# Patient Record
Sex: MALE | Race: WHITE
[De-identification: names, ages, dates, MRNs, and addresses within clinical notes are randomized per-mention and may not be internally consistent; named-entity substitution may affect disease eponyms.]

---

## 2023-07-18 ENCOUNTER — HOSPITAL ENCOUNTER (INPATIENT)
Dept: HOSPITAL 97 - ER | Age: 68
LOS: 10 days | Discharge: HOME | DRG: 536 | End: 2023-07-28
Attending: HOSPITALIST | Admitting: INTERNAL MEDICINE
Payer: COMMERCIAL

## 2023-07-18 VITALS — BODY MASS INDEX: 33.5 KG/M2

## 2023-07-18 DIAGNOSIS — E03.9: ICD-10-CM

## 2023-07-18 DIAGNOSIS — E78.00: ICD-10-CM

## 2023-07-18 DIAGNOSIS — Z79.899: ICD-10-CM

## 2023-07-18 DIAGNOSIS — S72.142A: Primary | ICD-10-CM

## 2023-07-18 DIAGNOSIS — E66.9: ICD-10-CM
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
## 2023-07-18 DIAGNOSIS — Z90.81: ICD-10-CM

## 2023-07-18 DIAGNOSIS — Z98.52: ICD-10-CM
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
## 2023-07-18 DIAGNOSIS — I95.1: ICD-10-CM
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
## 2023-07-18 DIAGNOSIS — I10: ICD-10-CM

## 2023-07-18 DIAGNOSIS — Z79.84: ICD-10-CM

## 2023-07-18 DIAGNOSIS — Z79.02: ICD-10-CM

## 2023-07-18 DIAGNOSIS — G47.33: ICD-10-CM

## 2023-07-18 DIAGNOSIS — Z88.8: ICD-10-CM

## 2023-07-18 DIAGNOSIS — Z79.890: ICD-10-CM

## 2023-07-18 DIAGNOSIS — Z71.3: ICD-10-CM
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
## 2023-07-18 LAB
BUN BLD-MCNC: 15 MG/DL (ref 7–18)
GLUCOSE SERPLBLD-MCNC: 119 MG/DL (ref 74–106)
HCT VFR BLD CALC: 40 % (ref 39.6–49)
HDLC SERPL-MCNC: 31 MG/DL (ref 40–60)
LDLC SERPL CALC-MCNC: 48 MG/DL (ref ?–130)
LYMPHOCYTES # SPEC AUTO: 2.2 K/UL (ref 0.7–4.9)
MAGNESIUM SERPL-MCNC: 1.9 MG/DL (ref 1.6–2.4)
MCV RBC: 99.3 FL (ref 80–100)
PMV BLD: 9.5 FL (ref 7.6–11.3)
POTASSIUM SERPL-SCNC: 4.1 MEQ/L (ref 3.5–5.1)
RBC # BLD: 4.03 M/UL (ref 4.33–5.43)
TROPONIN I SERPL HS-MCNC: 3.5 PG/ML (ref ?–58.9)

## 2023-07-18 PROCEDURE — 96375 TX/PRO/DX INJ NEW DRUG ADDON: CPT

## 2023-07-18 PROCEDURE — 83036 HEMOGLOBIN GLYCOSYLATED A1C: CPT

## 2023-07-18 PROCEDURE — 97161 PT EVAL LOW COMPLEX 20 MIN: CPT

## 2023-07-18 PROCEDURE — 83735 ASSAY OF MAGNESIUM: CPT

## 2023-07-18 PROCEDURE — 84484 ASSAY OF TROPONIN QUANT: CPT

## 2023-07-18 PROCEDURE — 97530 THERAPEUTIC ACTIVITIES: CPT

## 2023-07-18 PROCEDURE — 85025 COMPLETE CBC W/AUTO DIFF WBC: CPT

## 2023-07-18 PROCEDURE — 97116 GAIT TRAINING THERAPY: CPT

## 2023-07-18 PROCEDURE — 82947 ASSAY GLUCOSE BLOOD QUANT: CPT

## 2023-07-18 PROCEDURE — 80061 LIPID PANEL: CPT

## 2023-07-18 PROCEDURE — 36415 COLL VENOUS BLD VENIPUNCTURE: CPT

## 2023-07-18 PROCEDURE — 99285 EMERGENCY DEPT VISIT HI MDM: CPT

## 2023-07-18 PROCEDURE — 96374 THER/PROPH/DIAG INJ IV PUSH: CPT

## 2023-07-18 PROCEDURE — 80048 BASIC METABOLIC PNL TOTAL CA: CPT

## 2023-07-18 PROCEDURE — 71045 X-RAY EXAM CHEST 1 VIEW: CPT

## 2023-07-18 PROCEDURE — 97110 THERAPEUTIC EXERCISES: CPT

## 2023-07-18 PROCEDURE — 5A09457 ASSISTANCE WITH RESPIRATORY VENTILATION, 24-96 CONSECUTIVE HOURS, CONTINUOUS POSITIVE AIRWAY PRESSURE: ICD-10-PCS

## 2023-07-18 PROCEDURE — 81001 URINALYSIS AUTO W/SCOPE: CPT

## 2023-07-18 PROCEDURE — 84100 ASSAY OF PHOSPHORUS: CPT

## 2023-07-18 PROCEDURE — 72170 X-RAY EXAM OF PELVIS: CPT

## 2023-07-18 PROCEDURE — 93005 ELECTROCARDIOGRAM TRACING: CPT

## 2023-07-18 PROCEDURE — 97165 OT EVAL LOW COMPLEX 30 MIN: CPT

## 2023-07-18 RX ADMIN — HYDROMORPHONE HYDROCHLORIDE PRN MG: 1 INJECTION, SOLUTION INTRAMUSCULAR; INTRAVENOUS; SUBCUTANEOUS at 21:42

## 2023-07-18 RX ADMIN — ACETAMINOPHEN PRN MG: 325 TABLET ORAL at 21:47

## 2023-07-18 RX ADMIN — ATORVASTATIN CALCIUM SCH MG: 20 TABLET, FILM COATED ORAL at 21:10

## 2023-07-18 RX ADMIN — HEPARIN SODIUM SCH UNIT: 5000 INJECTION, SOLUTION INTRAVENOUS; SUBCUTANEOUS at 21:18

## 2023-07-18 RX ADMIN — Medication SCH ML: at 21:12

## 2023-07-18 RX ADMIN — MORPHINE SULFATE PRN MG: 4 INJECTION, SOLUTION INTRAMUSCULAR; INTRAVENOUS at 13:29

## 2023-07-18 RX ADMIN — MORPHINE SULFATE PRN MG: 4 INJECTION, SOLUTION INTRAMUSCULAR; INTRAVENOUS at 17:38

## 2023-07-18 RX ADMIN — ACETAMINOPHEN PRN MG: 325 TABLET ORAL at 16:00

## 2023-07-18 RX ADMIN — PANTOPRAZOLE SODIUM SCH MG: 40 TABLET, DELAYED RELEASE ORAL at 21:11

## 2023-07-18 NOTE — RAD REPORT
EXAM DESCRIPTION:  RAD - Hip Left 2 View - 7/18/2023 9:24 am

 

CLINICAL HISTORY:   Left hip pain status post injury

 

FINDINGS:   Intertrochanteric/greater trochanteric fracture left femur. Fracture fragments  
by 1 centimeter.

 

No dislocation

## 2023-07-18 NOTE — ER
Nurse's Notes                                                                                     

 [facility]                                                                 

Name: Flavio Summers                                                                                

Age: 68 yrs                                                                                       

Sex: Male                                                                                         

: 1955                                                                                   

MRN: [id number]                                                                                   

Arrival Date: 2023                                                                          

Time: 08:41                                                                                       

Account#: [id number]                                                                            

Bed 7                                                                                             

Private MD:                                                                                       

Diagnosis: Intertrochanteric fracture of femur                                                    

                                                                                                  

Presentation:                                                                                     

                                                                                             

08:41 Chief complaint: EMS states: slipped and fell onto left hip taking garbage out today.   aa5 

      Denies head injury, denies LOC.                                                             

08:41 Coronavirus screen: At this time, the client does not indicate any symptoms associated  aa5 

      with coronavirus-19. Ebola Screen: Patient denies travel to an Ebola-affected area in       

      the 21 days before illness onset. Initial Sepsis Screen: Does the patient meet any 2        

      criteria? No. Patient's initial sepsis screen is negative. Does the patient have a          

      suspected source of infection? No. Patient's initial sepsis screen is negative. Risk        

      Assessment: Do you want to hurt yourself or someone else? Patient reports no desire to      

      harm self or others. Onset of symptoms was 2023.                                   

08:41 Acuity: REMA 3                                                                           aa5 

08:41 Method Of Arrival: EMS: [facility]                                                     aa5 

08:41 Care prior to arrival: IV initiated. 20 GA, in the right antecubital area.              aa5 

08:41 Mechanism of Injury: Fall from standing position. Trauma event details: Injury occurred aa5 

      in the [facility], Injury occurred: at home. Injury occurred: 2023.        

12:14 Care prior to arrival: None.                                                            3 

                                                                                                  

Trauma Activation: Alert                                                                          

 Physician: ED Physician; Name: ; Notified At: ; Arrived At:                                      

 Physician: General Surgeon; Name: ; Notified At: ; Arrived At:                                   

 Physician: Radiology; Name: ; Notified At: ; Arrived At:                                         

 Physician: Respiratory; Name: ; Notified At: ; Arrived At:                                       

 Physician: Lab; Name: ; Notified At: ; Arrived At:                                               

                                                                                                  

Historical:                                                                                       

- Allergies:                                                                                      

08:43 Ibuprofen (itching );                                                                   aa5 

- Home Meds:                                                                                      

12:10 esomeprazole magnesium 40 mg oral capsule,delayed release (e.c.) [Active];              3 

      levothyroxine 112 mcg tablet daily [Active]; atorvastatin 20 mg oral tablet every           

      evening [Active]; carvedilol 12.5 mg oral tablet 2 times per day [Active]; metformin        

      500 mg Oral Tablet, Extended Release 24 hr [Active]; tadalafil 5 mg oral tablet             

      [Active]; cephalexin 500 mg oral capsule 4 times per day [Active];                          

- PMHx:                                                                                           

08:43 Hypertensive disorder; borderline diabetes; Hypothyroidism; Hypercholesterolemia;       aa5 

- PSHx:                                                                                           

08:43 spleenectomy; niyah knee sx; rotator cuff; Vasectomy;                                     aa5 

                                                                                                  

- Immunization history:: Adult Immunizations unknown.                                             

- Social history:: Smoking status: Patient denies any tobacco usage or history of.                

- Immunization history: Last tetanus immunization: - up to date.                                  

                                                                                                  

                                                                                                  

Screenin:48 [facility] ED Fall Risk Assessment (Adult) History of falling in the last 3 months,       aa5 

      including since admission Yes- single mechanical fall (1 pt) Confusion or                   

      Disorientation No (0 pts) Intoxicated or Sedated No (0 pts) Impaired Gait No (0 pts)        

      Mobility Assist Device Used No (0 pt) Altered Elimination No (0 pt) Score/Fall Risk         

      Level 0 - 2 = Low Risk Oriented to surroundings, Maintained a safe environment,             

      Educated pt \T\ family on fall prevention, incl call for assistance when getting out of     

      bed. Abuse screen: Denies threats or abuse. Nutritional screening: No deficits noted.       

      Tuberculosis screening: No symptoms or risk factors identified.                             

                                                                                                  

Primary Survey:                                                                                   

08:42 NO uncontrolled hemorrhage observed. A: The client is awake and alert. The airway is    aa5 

      patent. Breathing/Chest: Spontaneous respiratory effort, equal unlabored respirations,      

      breath sounds clear bilaterally, regular pattern, symmetrical chest rise and fall.          

      Circulation: No external hemorrhage present. Regular and strong central pulse, skin         

      warm/dry/normal color. Disability Client is alert. Exposure/Environment: A warming          

      method has been applied: A warm blanket has been provided to the patient.                   

09:00 Reassessment Alertness and Airway: Awake and alert. The airway is patent. Breathing:    aa5 

      Spontaneous respiratory effort, equal unlabored respirations, breath sounds clear           

      bilaterally, regular pattern with symmetrical chest rise and fall. Circulation: No          

      external hemorrhage noted. Regular and strong central pulse, skin warm/dry/normal           

      color. Disability: Alert.                                                                   

                                                                                                  

Secondary Survey:                                                                                 

08:42 HEENT: No deficits noted. Gastrointestinal: No deficits noted. : No signs and/or      aa5 

      symptoms were reported regarding the genitourinary system. Musculoskeletal: Reports         

      pain in left hip.                                                                           

                                                                                                  

Assessment:                                                                                       

08:42 General: Appears comfortable, Behavior is calm, cooperative. Pain: Complains of pain in aa5 

      left hip Pain currently is 3 out of 10 on a pain scale. Quality of pain is described as     

      crampy, Is continuous, Noted to be resistant to movement. Neuro: Level of Consciousness     

      is awake, alert, obeys commands, Oriented to person, place, time, situation.                

      Cardiovascular: Heart tones S1 S2 present Rhythm is regular. Respiratory: Airway is         

      patent Respiratory effort is even, unlabored, Respiratory pattern is regular,               

      symmetrical. GI: No signs and/or symptoms were reported involving the gastrointestinal      

      system. : No signs and/or symptoms were reported regarding the genitourinary system.      

      EENT: No signs and/or symptoms were reported regarding the EENT system. Derm: Skin is       

      pink, warm \T\ dry. Musculoskeletal: Reports pain in left hip left leg is externally        

      rotated.                                                                                    

08:50 Reassessment: Patient is alert, oriented x 3, equal unlabored respirations, skin        aa5 

      warm/dry/pink. Pt states "I am okay just as long as I don't move". Pain medication          

      offered by provider, pt declined at this time. .                                            

09:07 Reassessment: Pt now requesting pain medication, provider notified. .                   aa5 

09:10 Reassessment: Patient is alert, oriented x 3, equal unlabored respirations, skin        aa5 

      warm/dry/pink.                                                                              

09:12 Reassessment: X-RAY AT BEDSIDE .                                                        aa5 

09:40 Reassessment: Patient appears in no apparent distress at this time. Patient and/or      eh3 

      family updated on plan of care and expected duration. Pain level reassessed. Patient is     

      alert, oriented x 3, equal unlabored respirations, skin warm/dry/pink.                      

10:30 Reassessment: Patient appears in no apparent distress at this time. Patient and/or      eh3 

      family updated on plan of care and expected duration. Pain level reassessed. Patient is     

      alert, oriented x 3, equal unlabored respirations, skin warm/dry/pink.                      

11:30 Reassessment: Patient appears in no apparent distress at this time. Patient and/or      eh3 

      family updated on plan of care and expected duration. Pain level reassessed. Patient is     

      alert, oriented x 3, equal unlabored respirations, skin warm/dry/pink.                      

12:30 Reassessment: Patient appears in no apparent distress at this time. Patient and/or      eh3 

      family updated on plan of care and expected duration. Pain level reassessed. Patient is     

      alert, oriented x 3, equal unlabored respirations, skin warm/dry/pink.                      

                                                                                                  

Vital Signs:                                                                                      

08:41  / 82; Pulse 70; Resp 18 S; Temp 98(TE); Pulse Ox 100% ; Weight 108.86 kg (R);    aa5 

      Height 5 ft. 11 in. (R);                                                                    

09:10  / 77; Pulse 75; Resp 18 S; Pulse Ox 100% on R/A; Pain 6/10;                      aa5 

09:40  / 71; Pulse 65; Resp 18; Pulse Ox 100% on R/A;                                   eh3 

10:30  / 79; Pulse 67; Resp 18; Pulse Ox 100% on R/A;                                   eh3 

11:30  / 69; Pulse 76; Resp 18; Pulse Ox 100% on R/A;                                   eh3 

12:30  / 70; Pulse 76; Resp 18; Pulse Ox 100% on R/A;                                   eh3 

08:41 Body Mass Index 33.47 (108.86 kg, 180.34 cm)                                            aa5 

09:10 Pain Scale: Adult                                                                       aa5 

                                                                                                  

Amanda Coma Score:                                                                               

08:42 Eye Response: spontaneous(4). Motor Response: obeys commands(6). Verbal Response:       aa5 

      oriented(5). Total: 15.                                                                     

                                                                                                  

Trauma Score (Adult):                                                                             

08:42 Eye Response: spontaneous(1); Verbal Response: oriented(1); Motor Response: obeys       aa5 

      commands(2); Systolic BP: > 89 mm Hg(4); Respiratory Rate: 10 to 29 per min(4); [place]     

      Score: 15; Trauma Score: 12                                                                 

                                                                                                  

ED Course:                                                                                        

08:41 Patient arrived in ED.                                                                  aa5 

08:42 Arm band placed on Patient placed in an exam room.                                      aa5 

08:42 Patient has correct armband on for positive identification. Bed in low position. Call   aa5 

      light in reach. Side rails up X2. Adult w/ patient.                                         

08:43 Triage completed.                                                                       aa5 

08:44 [facility]inder [facility]                                                            sb4 

08:44 Ori Meehan MD is Attending Physician.                                              sb4 

08:47 Patient maintains SpO2 saturation greater than 95% on room air. Thermoregulation: warm  aa5 

      blanket given to patient.                                                                   

08:48 Christine Gupta RN is Primary Nurse.                                                   aa5 

09:16 Report given to MAURICE Booth.                                                               aa5 

09:26 Hip Left 2 View XRAY In Process Unspecified.                                            EDMS

10:29 Giuseppe Gama MD is Hospitalizing Provider.                                            sb4 

10:42 XRAY Chest (1 view) In Process Unspecified.                                             EDMS

12:14 No provider procedures requiring assistance completed. Maintain EMS IV. Dressing        eh3 

      intact. Good blood return noted. Site clean \T\ dry. Gauge \T\ site: 20g RAC.               

12:56 Patient admitted, IV remains in place.                                                  eh3 

                                                                                                  

Administered Medications:                                                                         

09:10 Drug: Ondansetron IVP 4 mg Route: IVP; Site: right antecubital;                         aa5 

11:13 Follow up: Response: No adverse reaction                                                eh3 

09:12 Drug: fentaNYL (PF) IVP 50 mcg Route: IVP; Site: right antecubital;                     aa5 

11:13 Follow up: Response: No adverse reaction                                                eh3 

10:35 Drug: HYDROmorphone IVP 0.5 mg Route: IVP; Site: right antecubital;                     eh3 

11:13 Follow up: Response: No adverse reaction                                                eh3 

12:09 Drug: HYDROmorphone IVP 0.5 mg Route: IVP; Site: right antecubital;                     eh3 

12:58 Follow up: Response: No adverse reaction                                                eh3 

                                                                                                  

                                                                                                  

Medication:                                                                                       

12:15 VIS not applicable for this client.                                                     eh3 

                                                                                                  

Outcome:                                                                                          

10:30 Decision to Hospitalize by Provider.                                                    sb4 

12:57 Admitted to Med/surg accompanied by tech, family with patient, via stretcher, room 201, eh3 

      Report called to  [place]                                                                     

12:57 Condition: stable                                                                           

12:57 Instructed on the need for admit.                                                           

13:01 Patient left the ED.                                                                    eh3 

                                                                                                  

Signatures:                                                                                       

Dispatcher [employer]                           EDChristine Estevez, MAURICE                     RN   aa5                                                  

Emmy Nuñez RN                          RN   eh3                                                  

Darya Linder, PADwaineC                     PAALICIA sb4                                                  

                                                                                                  

**************************************************************************************************

## 2023-07-18 NOTE — EDPHYS
Physician Documentation                                                                           

 [facility]                                                                 

Name: Flavio Summers                                                                                

Age: 68 yrs                                                                                       

Sex: Male                                                                                         

: 1955                                                                                   

MRN: [id number]                                                                                   

Arrival Date: 2023                                                                          

Time: 08:41                                                                                       

Account#: [id number]                                                                            

Bed 7                                                                                             

Private MD:                                                                                       

ED Physician Ori Meehan                                                                       

HPI:                                                                                              

                                                                                             

08:53 This 68 yrs old Male presents to ER via EMS with complaints of Fall Injury.             sb4 

08:53 Details of fall: The patient fell from an upright position, while walking. Onset: The   sb4 

      symptoms/episode began/occurred just prior to arrival. Associated injuries: The patient     

      sustained left hip, painful injury. 68 year old male with PMH of hypertension,              

      hypothyroidism, hypercholesterolemia presents s/p fall. He states that he was taking        

      his trash out this morning, tripped on his crocs, and fell directly onto his left hip.      

      He was on the ground for about 15 minutes and could not get up. He reports pain to the      

      left hip, no obvious deformity. States the pain is minimal when he is not moving it.        

      Neurovascularly intact.                                                                     

                                                                                                  

Historical:                                                                                       

- Allergies:                                                                                      

08:43 Ibuprofen (itching );                                                                   aa5 

- Home Meds:                                                                                      

12:10 esomeprazole magnesium 40 mg oral capsule,delayed release (e.c.) [Active];              eh3 

      levothyroxine 112 mcg tablet daily [Active]; atorvastatin 20 mg oral tablet every           

      evening [Active]; carvedilol 12.5 mg oral tablet 2 times per day [Active]; metformin        

      500 mg Oral Tablet, Extended Release 24 hr [Active]; tadalafil 5 mg oral tablet             

      [Active]; cephalexin 500 mg oral capsule 4 times per day [Active];                          

- PMHx:                                                                                           

08:43 Hypertensive disorder; borderline diabetes; Hypothyroidism; Hypercholesterolemia;       aa5 

- PSHx:                                                                                           

08:43 spleenectomy; niyah knee sx; rotator cuff; Vasectomy;                                     aa5 

                                                                                                  

- Immunization history:: Adult Immunizations unknown.                                             

- Social history:: Smoking status: Patient denies any tobacco usage or history of.                

- Immunization history: Last tetanus immunization: - up to date.                                  

                                                                                                  

                                                                                                  

ROS:                                                                                              

08:53 Constitutional: Negative for fever, chills, and weight loss, Eyes: Negative for injury, sb4 

      pain, redness, and discharge, ENT: Negative for injury, pain, and discharge,                

      Cardiovascular: Negative for chest pain, palpitations, and edema, Respiratory: Negative     

      for shortness of breath, cough, wheezing, and pleuritic chest pain, Abdomen/GI:             

      Negative for abdominal pain, nausea, vomiting, diarrhea, and constipation, Skin:            

      Negative for injury, rash, and discoloration, Neuro: Negative for headache, weakness,       

      numbness, tingling, and seizure.                                                            

08:53 MS/extremity: Positive for injury or acute deformity, pain, of the left hip.                

08:53 All other systems are negative.                                                             

                                                                                                  

Exam:                                                                                             

08:53 Constitutional:  This is a well developed, well nourished patient who is awake, alert,  sb4 

      and in no acute distress. Head/Face:  Normocephalic, atraumatic. Eyes:  Extra-ocular        

      motions intact.  Periorbital areas with no swelling, redness, or edema. Cardiovascular:     

       Regular rate and rhythm with a normal S1 and S2. Respiratory:  Lungs have equal breath     

      sounds bilaterally, clear to auscultation and percussion.  No rales, rhonchi or wheezes     

      noted.  No increased work of breathing, no retractions or nasal flaring. Abdomen/GI:        

      Soft, non-tender, no distension. Skin:  Warm, dry with normal turgor.  Normal color         

      with no rashes, no lesions, and no evidence of cellulitis. Neuro:  Awake and alert, GCS     

      15, oriented to person, place, time, and situation.  Cranial nerves II-XII grossly          

      intact.  Motor strength 5/5 in all extremities.  Sensory grossly intact.  Cerebellar        

      exam normal.  Normal gait.                                                                  

08:53 Musculoskeletal/extremity: Extremities: ROM: limited active range of motion due to          

      pain, limited passive range of motion due to pain, Circulation is intact in all             

      extremities. Sensation intact.                                                              

                                                                                                  

Vital Signs:                                                                                      

08:41  / 82; Pulse 70; Resp 18 S; Temp 98(TE); Pulse Ox 100% ; Weight 108.86 kg (R);    aa5 

      Height 5 ft. 11 in. (R);                                                                    

09:10  / 77; Pulse 75; Resp 18 S; Pulse Ox 100% on R/A; Pain 6/10;                      aa5 

09:40  / 71; Pulse 65; Resp 18; Pulse Ox 100% on R/A;                                   eh3 

10:30  / 79; Pulse 67; Resp 18; Pulse Ox 100% on R/A;                                   eh3 

11:30  / 69; Pulse 76; Resp 18; Pulse Ox 100% on R/A;                                   eh3 

12:30  / 70; Pulse 76; Resp 18; Pulse Ox 100% on R/A;                                   eh3 

08:41 Body Mass Index 33.47 (108.86 kg, 180.34 cm)                                            aa5 

09:10 Pain Scale: Adult                                                                       aa5 

                                                                                                  

[place] Coma Score:                                                                               

08:42 Eye Response: spontaneous(4). Motor Response: obeys commands(6). Verbal Response:       aa5 

      oriented(5). Total: 15.                                                                     

                                                                                                  

Trauma Score (Adult):                                                                             

08:42 Eye Response: spontaneous(1); Verbal Response: oriented(1); Motor Response: obeys       aa5 

      commands(2); Systolic BP: > 89 mm Hg(4); Respiratory Rate: 10 to 29 per min(4); [place]     

      Score: 15; Trauma Score: 12                                                                 

                                                                                                  

MDM:                                                                                              

08:44 Patient medically screened.                                                             sb4 

08:53 Differential diagnosis: fracture, strain, dislocation.                                  sb4 

10:28 Data reviewed: vital signs, nurses notes, radiologic studies, plain films, I have       sb4 

      discussed the patient's presentation/case with the attending Emergency Department           

      Physician; and as a result, I will admit patient. Consideration of                          

      Admission/Observation Patient was admitted/placed on observation. Management of patient     

      was discussed with the following: Consultant: Nate Verdugo. Management of            

      patient was discussed with the following: Hospitalist: Alvaro. Historians other than the     

      Patient: Spouse/Significant Other: spouse. Counseling: I had a detailed discussion with     

      the patient and/or guardian regarding: the historical points, exam findings, and any        

      diagnostic results supporting the discharge/admit diagnosis, radiology results, the         

      need for further work-up and treatment in the hospital.                                     

                                                                                                  

                                                                                             

10:03 Order name: Basic Metabolic Panel; Complete Time: 10:59                                 4 

                                                                                             

10:03 Order name: CBC with Diff; Complete Time: 10:52                                         sb4 

                                                                                             

11:19 Order name: Hemoglobin A1c; Complete Time: 14:05                                        EDMS

                                                                                             

11:19 Order name: Lipid Profile; Complete Time: 14:05                                         EDMS

                                                                                             

11:28 Order name: Magnesium; Complete Time: 14:05                                             EDMS

                                                                                             

11:28 Order name: Phosphorus; Complete Time: 14:05                                            EDMS

                                                                                             

11:28 Order name: Troponin High Sensitivity; Complete Time: 14:05                             EDMS

                                                                                             

11:28 Order name: Urinalysis w/ reflexes                                                      EDMS

                                                                                             

11:28 Order name: Basic Metabolic Panel                                                       EDMS

                                                                                             

11:28 Order name: Basic Metabolic Panel                                                       EDMS

                                                                                             

11:28 Order name: CBC with Automated Diff                                                     EDMS

                                                                                             

11:28 Order name: CBC with Automated Diff                                                     EDMS

                                                                                             

08:45 Order name: Hip Left 2 View XRAY; Complete Time: 09:58                                  sb4 

                                                                                             

10:03 Order name: XRAY Chest (1 view); Complete Time: 10:59                                   sb4 

                                                                                             

10:03 Order name: EKG; Complete Time: 10:04                                                   sb4 

                                                                                             

11:24 Order name: Heart Healthy                                                               EDMS

                                                                                             

10:03 Order name: EKG - Nurse/Tech; Complete Time: 10:35                                      sb4 

                                                                                             

10:03 Order name: IV Saline Lock; Complete Time: 10:20                                        sb4 

                                                                                             

10:03 Order name: O2 Per Protocol; Complete Time: 10:20                                       sb4 

                                                                                             

10:03 Order name: O2 Sat Monitoring; Complete Time: 10:20                                     sb4 

                                                                                                  

ECG:                                                                                              

10:34 Rate is 44 beats/min. Rhythm is regular, Normal Sinus Rhythm. QRS Hitchcock is Normal. IA    sb4 

      interval is normal at 206 msec. QRS interval is normal at 90 msec. QT interval is           

      normal at 394 msec. No Q waves. T waves are Normal. Clinical impression: Normal ECG.        

      Interpreted by me. Reviewed by me.                                                          

                                                                                                  

Administered Medications:                                                                         

09:10 Drug: Ondansetron IVP 4 mg Route: IVP; Site: right antecubital;                         aa5 

11:13 Follow up: Response: No adverse reaction                                                eh3 

09:12 Drug: fentaNYL (PF) IVP 50 mcg Route: IVP; Site: right antecubital;                     aa5 

11:13 Follow up: Response: No adverse reaction                                                eh3 

10:35 Drug: HYDROmorphone IVP 0.5 mg Route: IVP; Site: right antecubital;                     eh3 

11:13 Follow up: Response: No adverse reaction                                                eh3 

12:09 Drug: HYDROmorphone IVP 0.5 mg Route: IVP; Site: right antecubital;                     eh3 

12:58 Follow up: Response: No adverse reaction                                                eh3 

                                                                                                  

                                                                                                  

Disposition:                                                                                      

13:22 Co-signature as Attending Physician, Ori Meehan MD I agree with the assessment and   kdr 

      plan of care.                                                                               

                                                                                                  

Disposition Summary:                                                                              

23 10:30                                                                                    

Hospitalization Ordered                                                                           

      Hospitalization Status: Inpatient Admission                                             sb4 

      Provider: Giuseppe Gama                                                                 sb4 

      Location: [facility] (Inpatient)                                                 sb4 

      Condition: Fair                                                                         sb4 

      Problem: new                                                                            sb4 

      Symptoms: are unchanged                                                                 sb4 

      Bed/Room Type: Standard                                                                 sb4 

      Room Assignment: 201(23 12:12)                                                    dw  

      Diagnosis                                                                                   

        - Intertrochanteric fracture of femur                                                 sb4 

      Forms:                                                                                      

        - Medication Reconciliation Form                                                      sb4 

        - SBAR form                                                                           sb4 

Signatures:                                                                                       

Dispatcher [employer]                           Dafne Claros RN                        RN   dw                                                   

Ori Meehan MD MD   [facility]                                                  

Christine Gupta RN                     RN   5                                                  

Emmy Nuñez RN                          RN   3                                                  

Darya Linder PA-C                     [facility] sb4                                                  

                                                                                                  

Corrections: (The following items were deleted from the chart)                                    

12:12 10:30 sb4                                                                               dw  

                                                                                                  

**************************************************************************************************

## 2023-07-18 NOTE — XMS REPORT
Continuity of Care Document

                            Created on:2023



Patient:JONO SUMMERS

Sex:Male

:1955

External Reference #:[id number]





Demographics







                          Address                   [address] [place] [address]

 

                          Home Phone                9

 

                          Work Phone                [phone]

 

                          Mobile Phone              [phone]

 

                          Email Address             [email or web address]

 

                          Preferred Language        English

 

                          Marital Status            Unknown

 

                          Restorationist Affiliation     Unknown

 

                          Race                      Unknown

 

                          Additional Race(s)        White

 

                          Ethnic Group              Unknown









Author







                          Organization              [facility]

t

 

                          Address                   [address] [place] [address]. [address]



                                                    [place] [address]

 

                          Phone                     [phone]









Support







                Name            Relationship    Address         Phone

 

                AR SUMMERS               Unavailable     [phone]

 

                Nelly Summers   Spouse          [address] [phone]



                                                [place] [address] 









Care Team Providers







                    Name                Role                Phone

 

                    PCP, PATIENT DOES NOT HAVE A Primary Care Physician UnavailDeepa Luna MD     Attending Clinician [phone]

 

                    Unknown, Attending  Attending Clinician Unavailable

 

                    DEEPA GRAHAM        Attending Clinician Unavailable

 

                    Doctor Unassigned, No Name Attending Clinician Unavailable

 

                    Derick Malcolm   Attending Clinician [phone]

 

                    Giuseppe Alvarado Attending Clinician [phone]

 

                    Nas Castro MD Attending Clinician [phone]

[phone]

 

                    VISIT, NURSE FLORI SLEEP Attending Clinician Unavailable

 

                    Leah Felipe MA     Attending Clinician Unavailable

 

                    Katina Resendez  Attending Clinician [phone]

 

                    Nas Castro Attending Clinician [phone]

 

                    VISIT, NURSE KADEN SLEEP Attending Clinician Unavailable

 

                    Alexis Xiao         Attending Clinician [phone]

 

                    VISIT, NURSE FLORI BONNER Attending Clinician Unavailable

 

                    Katya Wilson      Attending Clinician [phone]

 

                    Clyde Sheldon      Attending Clinician [phone]

 

                    Clyde Sheldon      Admitting Clinician [phone]









Payers







           Payer Name Policy Type Policy Number Effective Date Expiration Date S

ource







Problems







       Condition Condition Condition Status Onset  Resolution Last   Treating Co

mments 

Source



       Name   Details Category        Date   Date   Treatment Clinician        



                                                 Date                 

 

       M23 -  M23 - Diagnosis Active 2017-0        2017             

  [facility]



       UNSPECIFIE UNSPECIFIE                         12:58:00               

l



       D INTERNAL D INTERNAL               00:01:                             He

stew



       DERANGEME DERANGEME               00                                 



              Active                                                  



              2017                                                  



               DANITA Reis                                                  

 

       Diabetes  Diabetes Problem Active 2023               

[facility]



       mellitus mellitus               -          23:02:26               l



       type 2 type 2               00:00:                             Neo



       (disorder) (disorder)               00                                 



              Active                                                  



              2015                                                  



               Problem                                                  



              2023                                                  



              Data                                                    



              migrated                                                  



              from                                                   



              Recovers                                                  



              on 8/5/15.                                                  



               Medical                                                  



              Group,[facility]                                                  



              [place]                                                   



              [place],[facility]                                                  



              Internal                                                  



              Medicine                                                  



              [facility]                                                  

 

       Metabolic        Problem Active 2023               Me

moria



       syndrome X Metabolic               3-24          23:02:26               l



       (disorder) syndrome X               00:00:                             He

stew



              (disorder)               00                                 



              Active                                                  



              2015                                                  



              Problem                                                  



              2023                                                  



              Data                                                    



              migrated                                                  



              from                                                   



              Recovers                                                  



              on 7/8/15.                                                  



               Medical                                                  



              Group,[facility]                                                  



              [place]                                                   



              [place],[facility]                                                  



              Internal                                                  



              Medicine                                                  



              [facility]                                                  

 

       719.41 -  719.41 - Diagnosis Active 2013             

  [facility]



       JOINT  JOINT                1-30          12:25:00               l



       PAIN-SHLD PAIN-SHLD               00:01:                             Herm

malik



              Active               00                                 



              2013                                                  



               [facility] [place]                                                  

 

       Hypothyroi  Hypothyro Problem Active 2023            

   [facility]



       dism   idism                2-26          23:02:26               l



       (disorder) (disorder)               00:00:                             He

rmann



              Active               00                                 



              2012                                                  



              Problem                                                  



              2023                                                  



              Data                                                    



              migrated                                                  



              from                                                   



              Recovers                                                  



              on                                                      



              5/30/15.                                                  



               Medical                                                  



              Group,[facility]                                                  



              [place]                                                   



              [place],[facility]                                                  



              Internal                                                  



              Medicine                                                  



              [facility]                                                  

 

       No known No known Disease                                           Metho

di



       active active                                                  st



       problems problems                                                  Hospit

a



                                                                      l

 

       Hyperchole  Hyperchol Problem Resolve               2022           

    [facility]



       sterolemia esterolemi        d                    01:45:23               

l



       (disorder) a                                                       Pablo

n



              (disorder)                                                  



              Resolved                                                  



              Problem                                                  



              2022                                                  



               Medical                                                  



              Group,                                                  



              [facility] [place]                                                  



              [place],                                                  



              [place]                                                  

 

       Hypertensi  Hypertens Problem Resolve               2022           

    Memoria



       ve     ruth           d                    01:45:23               l



       disorder, disorder,                                                  Herm

malik



       systemic systemic                                                  



       arterial arterial                                                  



       (disorder) (disorder)                                                  



              Resolved                                                  



              Problem                                                  



              2022                                                  



               Medical                                                  



              Group,                                                  



              [place]                                                  

 

       Idiopathic  Idiopathi Problem Resolve               2022           

    [facility]



       thrombocyt c             d                    01:45:23               l



       openic thrombocyt                                                  Pablo

n



       purpura openic                                                  



       (disorder) purpura                                                  



              (disorder)                                                  



              Resolved                                                  



              Problem                                                  



              2022                                                  



              resolved                                                  



              1960"s                                                   



              Medical                                                  



              Group,[facility]                                                  

 

       Monoclonal  Monoclona Problem Resolve               2022           

    Memoria



       gammopathy l             d                    01:45:23               l



       (disorder) gammopathy                                                  He

rmann



              (disorder)                                                  



              Resolved                                                  



              Problem                                                  



              2022                                                  



               Medical                                                  



              Group,                                                  



              [facility] [place]                                                  



              [place],                                                  



              [place]                                                  

 

       Sleep   Sleep Problem Resolve               2022               Marcos

lori



       apnea  apnea         d                    01:45:23               l



       (finding) (finding)                                                  Herm

malik



              Resolved                                                  



              Problem                                                  



              2022                                                  



               uses cpap                                                  



              machine-                                                  



              will bring                                                  



              on dos                                                   



              Medical                                                  



              Group,[facility]                                                  

 

       Ankle pain  Ankle Problem Active               2023               M

emoria



       (finding) pain                               23:02:26               l



              (finding)                                                  Neo



              Active                                                  



              Problem                                                  



              2023                                                  



               [facility]                                                  

 

       At risk   At risk Problem Active               2023               M

emoria



       for    for                                23:02:26               l



       negative negative                                                  Pablo

n



       response response                                                  



       to     to                                                      



       medication medication                                                  



       (finding) (finding)                                                  



              Active                                                  



              Problem                                                  



              2023                                                  



                                                                    



              Medical                                                  



              Group,                                                  



              [facility]                                                  



              [place]                                                   



              [place],[facility]                                                  



              Internal                                                  



              Medicine                                                  



              [facility]                                                  

 

       Benign  Benign Problem Active               2023               Marcos

lori



       hypertensi hypertensi                             23:02:26               

l



       on     on                                                      Neo



       (disorder) (disorder)                                                  



              Active                                                  



              Problem                                                  



              2023                                                  



              Data                                                    



              migrated                                                  



              from [facility]                                                  



              on                                                      



              5/30/15.                                                  



               Medical                                                  



              Group,                                                  



              [facility]                                                  



              [place]                                                   



              [place],[facility]                                                  



              Internal                                                  



              Medicine                                                  



              [facility]                                                  

 

       Benign  Benign Problem Active               2023               Marcos

lori



       prostatic prostatic                             23:02:26               l



       hypertroph hypertroph                                                  He

rmann



       with   with                                                    



       outflow outflow                                                  



       obstructio obstructio                                                  



       n      n                                                       



       (disorder) (disorder)                                                  



              Active                                                  



              Problem                                                  



              2023                                                  



               [facility]                                                  

 

       Osteoarthr        Problem Active               2023               M

emoria



       itis of Osteoarthr                             23:02:26               l



       knee   itis of                                                  [place]



       (disorder) knee                                                    



              (disorder)                                                  



              Active                                                  



              Problem                                                  



              2023                                                  



                                                                    



              Medical                                                  



              Group,[facility]                                                  

 

       Derangemen        Problem Active               2023               M

emoria



       t of   Derangemen                             23:02:26               l



       medial t of                                                    [place]



       meniscus medial                                                  



       (disorder) meniscus                                                  



              (disorder)                                                  



              Active                                                  



              Problem                                                  



              2023                                                  



                                                                    



              Medical                                                  



              Group,[facility]                                                  

 

       Edema of   Edema of Problem Active               2023              

 Memoria



       lower  lower                              23:02:26               l



       extremity extremity                                                  Herm

malik



       (finding) (finding)                                                  



              Active                                                  



              Problem                                                  



              2023                                                  



                                                                    



              Medical                                                  



              Group,[facility]                                                  

 

       Elevated  Elevated Problem Active               2023               

Mem[facility]



       liver  liver                              23:02:26               l



       enzymes enzymes                                                  [place]



       level  level                                                   



       (finding) (finding)                                                  



              Active                                                  



              Problem                                                  



              2023                                                  



                                                                    



              Medical                                                  



              Group,[facility]                                                  



              Internal                                                  



              Medicine                                                  



              [place]                                                  

 

       Foot pain  Foot pain Problem Active               2023             

  [facility]



       (finding) (finding)                             23:02:26               l



              Active                                                  [place]



              Problem                                                  



              2023                                                  



               Medical                                                  



              Group,                                                  



              [facility] [place]                                                  



              [facility]                                                  



              [place]                                                   



              [place],[facility]                                                  

 

       Gastroesop  Gastroeso Problem Active               2023            

   Mem[facility]



       hageal phageal                             23:02:26               l



       reflux reflux                                                  [place]



       disease disease                                                  



       (disorder) (disorder)                                                  



              Active                                                  



              Problem                                                  



              2023                                                  



                                                                    



              Medical                                                  



              Group,                                                  



              [facility]                                                  



              [place]                                                   



              [place],[facility]                                                  

 

       Hyperlipid        Problem Active               2023               M

emoria



       emia   Hyperlipid                             23:02:26               l



       (disorder) emia                                                    [place]

n



              (disorder)                                                  



              Active                                                  



              Problem                                                  



              2023                                                  



                                                                    



              Medical                                                  



              Group,[facility]                                                  



              Internal                                                  



              Medicine                                                  



              [facility]                                                  

 

       Lesion of   Lesion Problem Active               2023               

[facility]



       skin of of skin of                             23:02:26               l



       face   face                                                    [place]



       (disorder) (disorder)                                                  



              Active                                                  



              Problem                                                  



              2023                                                  



                                                                    



              Medical                                                  



              Group,[facility]                                                  

 

       Microalbum  Microalbu Problem Active               2023            

   [facility]



       inuria minuria                             23:02:26               l



       (finding) (finding)                                                  [place]

malik



              Active                                                  



              Problem                                                  



              2023                                                  



               Medical                                                  



              Group,[facility]                                                  

 

       Nocturia  Nocturia Problem Active               2023               

[facility]



       (finding) (finding)                             23:02:26               l



              Active                                                  [place]



              Problem                                                  



              2023                                                  



               Medical                                                  



              Group,                                                  



              [facility]                                                  



              Land,                                                  



              [place]                                                   



              [place],[facility]                                                  

 

       Obesity  Obesity Problem Active               2023               Me

moria



       (disorder) (disorder)                             23:02:26               

l



              Active                                                  [place]



              Problem                                                  



              2023                                                  



               Medical                                                  



              Group,                                                  



              [facility] [place]                                                  



              Land,                                                  



              [place]                                                   



              [place],[facility]                                                  

 

       Obstructiv  Obstructi Problem Active               2023            

   [facility]



       e sleep ve sleep                             23:02:26               l



       apnea  apnea                                                   [place]



       syndrome syndrome                                                  



       (disorder) (disorder)                                                  



               Active                                                  



              Problem                                                  



              2023                                                  



              Data                                                    



              migrated                                                  



              from                                                   



              [employer]                                                  



              on                                                      



              5/30/15.                                                  



               Medical                                                  



              Group,                                                  



              [facility] [place]                                                  



              Land,                                                  



              [place]                                                   



              Land,[facility]                                                  



              Internal                                                  



              Medicine                                                  



              [facility]                                                  

 

       Pain    Pain  Problem Active               2023               Memor

ia



       (finding) (finding)                             23:02:26               l



              Active                                                  [place]



              Problem                                                  



              2023                                                  



              Right knee                                                  



               Medical                                                  



              Group,[facility]                                                  

 

       Patient   Patient Problem Active               2023               M

emoria



       encounter encounter                             23:02:26               l



       status status                                                  [place]



       (finding) (finding)                                                  



              Active                                                  



              Problem                                                  



              2023                                                  



               [facility]                                                   



              Internal                                                  



              Medicine                                                  



              [place]                                                  

 

       Plantar  Plantar Problem Active               2023               Me

moria



       fasciitis fasciitis                             23:02:26               l



       (disorder) (disorder)                                                  He

rmann



              Active                                                  



              Problem                                                  



              2023                                                  



               Medical                                                  



              Group,                                                  



              [facility]                                                  



              Land,                                                  



              [place]                                                   



              [place],[facility]                                                  

 

       Reducible        Problem Active               2023               Me

moria



       umbilical Reducible                             23:02:26               l



       hernia umbilical                                                  [place]



       (disorder) hernia                                                  



              (disorder)                                                  



              Active                                                  



              Problem                                                  



              2023                                                  



               [facility]                                                   



              Internal                                                  



              Medicine                                                  



              [place]                                                  

 

       Skin    Skin  Problem Active               2023               Memor

ia



       lesion lesion                             23:02:26               l



       (disorder) (disorder)                                                  He

rmann



               Active                                                  



              Problem                                                  



              2023                                                  



              [facility]                                                  

 

       Urinary  Urinary Problem Active               2023               Me

moria



       incontinen incontinen                             23:02:26               

l



       ce     ce                                                      [place]



       (finding) (finding)                                                  



              Active                                                  



              Problem                                                  



              2023                                                  



              [facility]                                                  

 

       ACUTE   ACUTE Diagnosis Active               2017               Mem

oria



       MEDIAL MEDIAL                             10:43:00               l



       MENISCAL MENISCAL                                                  [place]

n



       INJURY OF INJURY OF                                                  



       RIGHT  RIGHT                                                   



       KNEE-S89.8 KNEE-S89.8                                                  



       1XA    1XA                                                     



              Active                                                   



              [place]                                                  

 

       Essential  Essential Diagnosis        2023       

        [facility]



       hypertensi hypertensi               5-15   09:24:20 09:24:20             

  l



       on     on                   19:56:                             [place]



       (disorder) (disorder)               00                                 



              05/15/2023                                                  



               Diagnosis                                                  



              2023                                                  



               Medical                                                  



              [facility]                                                  



              Internal                                                  



              Medicine                                                  



              [place]                                                  

 

       Type II  Type II Diagnosis        2023           

    [facility]



       diabetes diabetes               5-15   09:24:20 09:24:20               l



       mellitus mellitus               19:56:                             [place]

n



       without without               00                                 



       complicati complicati                                                  



       on     on                                                      



       (disorder) (disorder)                                                  



              05/15/2023                                                  



              Diagnosis                                                  



              2023                                                  



               Medical                                                  



              Group,[facility]                                                  



              Internal                                                  



              Medicine                                                  



              [place]                                                  

 

       Finding of   Finding Diagnosis        2023       

        [facility]



       enzyme of enzyme               5-15   09:24:20 09:24:20               l



       level  level                19:56:                             [place]



       (finding) (finding)               00                                 



              05/15/2023                                                  



              Diagnosis                                                  



              2023                                                  



                                                                    



              Medical                                                  



              [facility]                                                  



              Internal                                                  



              Medicine                                                  



              [place]                                                  

 

       Screening  Screening Diagnosis        2023       

        [facility]



       for    for                  5-15   09:24:20 09:24:20               l



       malignant malignant               19:56:                             [place]

malik



       neoplasm neoplasm               00                                 



       of colon of colon                                                  



       done   done                                                    



              05/15/2023                                                  



              Diagnosis                                                  



              2023                                                  



              [facility]                                                    



              Internal                                                  



              Medicine                                                  



              [place]                                                  

 

       Long-term  Long-term Diagnosis        2023       

        [facility]



       current current               5-15   09:24:20 09:24:20               l



       use of use of               19:56:                             [place]



       drug   drug                 00                                 



       therapy therapy                                                  



       (situation (situation                                                  



       )      )                                                       



              05/15/2023                                                  



              Diagnosis                                                  



              2023                                                  



               Medical                                                  



              [facility]                                                  



              Internal                                                  



              Medicine                                                  



              [place]                                                  







History of Past Illness







       Condition Condition Condition Status Onset  Resolution Last   Treating Co

mments 

Source



       Name   Details Category        Date   Date   Treatment Clinician        



                                                 Date                 

 

       Umbilical        Diagnosis        2022           

    Mem[facility]



       hernia Umbilical                  09:44:28 09:44:28               l



       (disorder) hernia               20:53:                             [place]

n



              (disorder)               00                                 



              2022                                                  



              Diagnosis                                                  



              2022                                                  



               [facility]                                                   



              Internal                                                  



              Medicine                                                  



              [place]                                                  

 

       Type 2  Type 2 [facility]        2022               

[facility]



       diabetes diabetes               8-15   01:45:23 01:45:23               l



       mellitus mellitus               19:36:                             [place]

n



       without without               00                                 



       complicati complicati                                                  



       ons    ons                                                     



              08/15/2022                                                  



               08/18/202                                                  



              2                                                     



              Medical                                                  



              Group                                                   

 

       Hypothyroi  Hypothyro Problem        2022        

       [facility]



       dism,  idism,               8-15   01:45:23 01:45:23               l



       unspecifie unspecifie               19:36:                             He

rmann



       d      d                    00                                 



              08/15/2022                                                  



               08/18/202                                                  



              2                                                     



              Medical                                                  



              Group                                                   

 

       Metabolic  Metabolic Problem        2022         

      [facility]



       syndrome syndrome               8-15   01:45:23 01:45:23               l



              08/15/2022               19:36:                             [place] trinidad



              2022               00                                 



               Medical                                                  



              Group                                                   

 

       Hyperlipid  Hyperlipi Problem        2022        

       [facility]



       emia,  demia,               8-15   01:45:23 01:45:23               l



       unspecifie unspecifie               19:36:                             He

rmann



       d      d                    00                                 



              08/15/2022                                                  



              2022                                                  



               Medical                                                  



              Group                                                   

 

       Essential  Essential Problem        2022         

      [facility]



       (primary) (primary)               8-15   01:45:23 01:45:23               

l



       hypertensi hypertensi               19:36:                             He

rmann



       on     on                   00                                 



              08/15/2022                                                  



              2022                                                  



               Medical                                                  



              Group                                                   

 

       Proteinuri  Proteinur Problem        2022        

       son Hernandez,                  8-15   01:45:23 01:45:23               l



       unspecifie unspecifie               19:36:                             He

rmann



       d      d                    00                                 



              08/15/2022                                                  



              2022                                                  



               Medical                                                  



              Group                                                   

 

       Obstructiv  Obstructi Problem        2022        

       [place]



       e sleep ve sleep               8-15   01:45:23 01:45:23               l



       apnea  apnea                19:36:                             [place]



       (adult) (adult)               00                                 



       (pediatric (pediatric                                                  



       )      )                                                       



              08/15/2022                                                  



               08/18/202                                                  



              2                                                     



              Medical                                                  



              Group                                                   

 

       Other long  Other Problem        2022            

   [facility]



       term   long term               8-15   01:45:23 01:45:23               l



       (current) (current)               19:36:                             [place]

malik



       drug   drug                 00                                 



       therapy therapy                                                  



              08/15/2022                                                  



               08/18/202                                                  



              [facility]                                                  



              Group                                                   

 

       Abnormal  Abnormal Problem        2022           

    [facility]



       levels of levels of               8-15   01:45:23 01:45:23               

l



       other  other                19:36:                             [place]



       serum  serum                00                                 



       enzymes enzymes                                                  



              08/15/2022                                                  



              2022                                                  



               Medical                                                  



              Group                                                   

 

       Gastro-eso  Gastro-es Problem        2022        

       [facility]



       phageal ophageal                  01:48:25 01:48:25               l



       reflux reflux               21:41:                             [place]



       disease disease               00                                 



       without without                                                  



       esophagiti esophagiti                                                  



       s      s                                                       



              2022                                                  



              [facility]                                                  



              Group                                                   







Allergies, Adverse Reactions, Alerts







       Allergy Allergy Status Severity Reaction(s) Onset  Inactive Treating Comm

ents 

Source



       Name   Type                        Date   Date   Clinician        

 

       IBUPROFE DRUG   Active Low    Hives                        [facility]



       N      INGREDI                                              ity of



                                          00:00:                      [place]



                                          00                          Medical



                                                                      [facility]

 

       Ibuprofe Propensi Active        Itching                       Unive

rs



       n      ty to                                               ity of



              adverse                      00:00:                      [place]



              reaction                      00                          Medical



              s                                                       Branch

 

       Ibuprofe Propensi Active        Itching                       [facility]

di



       n      ty to                                               st



              adverse                      00:00:                      [facility]osp[facility]



              reaction                      00                          l



              s to                                                    



              drug                                                    

 

       ibuprofe ibuprofe Active                                     [facility]

a



       n<sup>1< n<sup>1<                      6-29                        l



       /sup>  /sup>                       05:00:                      [facility]



                                          00                          

 

       NO KNOWN Drug   Active                                           [facility]



       ALLERGIE Class                                                   ity of



       S                                                              [facility]







Social History







           Social Habit Start Date Stop Date  Quantity   Comments   Source

 

           Gender identity                                             [facility] [place]

y [facility]

 

           Sexual orientation                                             [facility]

ist



                                                                  Hospital

 

           History of Social 2022                       [facility]

st



           function   00:00:00   00:00:00                         Hospital

 

           Tobacco use and 2022 Smokeless             Mu-ism



           exposure   00:00:00   00:00:00   tobacco non-user            Hospital

 

           [facility] 2017                       [facility]



                      19:17:36   19:17:36                         

 

           Sex Assigned At 1955                       [facility]

y of



           Birth      00:00:00   00:00:00                         [facility]









                Smoking Status  Start Date      Stop Date       Source

 

                Tobacco smoking consumption                                 [facility]

[facility]

 

                Tobacco smoking status                                 [facility]







Medications







       Ordered Filled Start  Stop   Current Ordering Indication Dosage Frequency

 Signature

                    Comments            Components          Source



     Medication Medication Date Date Medication? Clinician                (SIG) 

          



     Name Name                                                   

 

     atorvastati            Yes            20mg      1 tablet.           U

nivers



     n 20 mg      7-17                                              ity of



     tablet      10:55:                                              [address]

 

     tadalafiL 5            Yes            5mg       Take 1           Univ

ers



     mg tablet      7-17                               tablet by           ity o

f



               10:55:                               mouth.           [address]

 

     levothyroxi            Yes            112ug      1 tablet.           

Univers



     ne 112 mcg      7-17                                              ity of



     tablet      10:55:                                              [address]

 

     cephALEXin       2023- Yes       [id number] 500mg      Take 1         

  [facility]



     (KEFLEX)      7-17 07-25                          capsule by           ity 

of



     500 mg      00:00: 04:59                          mouth 4           [place]



     capsule      00   :00                           (four)           Medical



                                                  times           [place]



                                                  daily for           



                                                  7 days.           

 

     esomeprazol            Yes            40mg      Take 1           Univ

ers



     e 40 mg      6-18                               capsule by           ity of



     capsule      00:00:                               mouth in           [place]



               00                                 the            Medical



                                                  morning.           Branch

 

     atorvastati            Yes                      = 1 tab,           Me

moria



     n 20 mg      5-15                               PO,            l



     oral tablet      19:55:                               Bedtime, #           

[place]



               00                                 90 tab, 3           



                                                  Refill(s),           



                                                  Pharmacy:           



                                                  [facility]           



                                                  [facility] #[id number],           



                                                  180.34,           



                                                  cm,            



                                                  05/15/23           



                                                  14:01:00           



                                                  CDT,           



                                                  Height,           



                                                  111.364,           



                                                  kg,            



                                                  05/15/23           



                                                  14:01:00           



                                                  CDT,           



                                                  Weight           

 

     carvedilol            Yes                      12.5 mg =           Me

moria



     12.5 mg      5-15                               1 tab, PO,           l



     oral tablet      19:55:                               BID, # 180           

[place]



               00                                 tab, 3           



                                                  Refill(s),           



                                                  Pharmacy:           



                                                  [facility]           



                                                  cy #[id number],           



                                                  180.34,           



                                                  cm,            



                                                  05/15/23           



                                                  14:01:00           



                                                  CDT,           



                                                  Height,           



                                                  111.364,           



                                                  kg,            



                                                  05/15/23           



                                                  14:01:00           



                                                  CDT,           



                                                  Weight           

 

     esomeprazol            Yes                      = 1 cap,           Me

moria



     e 40 mg      4-21                               PO, Daily,           l



     oral      18:22:                               # 90 cap,           [facility]



     delayed      00                                 0              



     release                                         Refill(s),           



     capsule                                         Pharmacy:           



                                                  [id number],           



                                                  180.34,           



                                                  cm,            



                                                  23           



                                                  14:21:00           



                                                  CST,           



                                                  Height,           



                                                  113.352,           



                                                  kg,            



                                                  23           



                                                  14:21:00           



                                                  CST,           



                                                  Weight           

 

     atorvastati            Yes                      = 1 tab,           Me

moria



     n 20 mg      4-21                               PO,            l



     oral tablet      18:22:                               Bedtime, #           

[facility]



               00                                 90 tab, 0           



                                                  Refill(s),           



                                                  Pharmacy:           



                                                  [facility] #[id number],           



                                                  180.34,           



                                                  cm,            



                                                  23           



                                                  14:21:00           



                                                  CST,           



                                                  Height,           



                                                  113.352,           



                                                  kg,            



                                                  23           



                                                  14:21:00           



                                                  CST,           



                                                  Weight           

 

     atorvastati            Yes                      = 1 tab,           Me

moria



     n 20 mg      1-26                               PO,            l



     oral tablet      21:51:                               Bedtime, #           

[facility]



               00                                 90 tab, 0           



                                                  Refill(s),           



                                                  Pharmacy:           



                                                  [facility]           



                                                  [id number],           



                                                  180.34,           



                                                  cm,            



                                                  22           



                                                  14:15:00           



                                                  CST,           



                                                  Height,           



                                                  114.091,           



                                                  kg,            



                                                  22           



                                                  14:15:00           



                                                  CST,           



                                                  Weight           

 

     tadalafiL            Yes            5mg  Q24H Take 1           Method

i



     (CIALIS) 5      1-10                               tablet (5           st



     MG tablet      09:52:                               mg total)           Hos

jhony



               19                                 by mouth           l



                                                  daily as           



                                                  needed.           

 

     metFORMIN      2022      Yes                      500 mg = 1           Me

moria



     500 mg oral      1-14                               tab, PO,           l



     tablet      20:53:                               BID-Meals,           Isi

nn



               00                                 # 180 tab,           



                                                  3              



                                                  Refill(s),           



                                                  Pharmacy:           



                                                  [facility] #[id number],           



                                                  180.34,           



                                                  cm,            



                                                  22           



                                                  14:15:00           



                                                  CST,           



                                                  Height,           



                                                  114.091,           



                                                  kg,            



                                                  22           



                                                  14:15:00           



                                                  CST,           



                                                  Weight           

 

     atorvastati      2022      Yes                      = 1 tab,           Me

moria



     n 20 mg      0-07                               PO,            l



     oral tablet      18:33:                               Bedtime, #           

[place]



               00                                 90 tab, 0           



                                                  Refill(s),           



                                                  Pharmacy:           



                                                  [facility]           



                                                  [id number],           



                                                  180.34,           



                                                  cm,            



                                                  08/15/22           



                                                  14:16:00           



                                                  CDT,           



                                                  Height,           



                                                  113.182,           



                                                  kg,            



                                                  08/15/22           



                                                  14:16:00           



                                                  CDT,           



                                                  Weight           

 

     carvedilol            Yes                      12.5 mg =           Me

moria



     12.5 mg      8-15                               1 tab, PO,           l



     oral tablet      19:35:                               BID, # 180           

[place]



               00                                 tab, 3           



                                                  Refill(s),           



                                                  Pharmacy:           



                                                  [facility] #[id number],           



                                                  180.34,           



                                                  cm,            



                                                  08/15/22           



                                                  14:16:00           



                                                  CDT,           



                                                  Height,           



                                                  113.182,           



                                                  kg,            



                                                  08/15/22           



                                                  14:16:00           



                                                  CDT,           



                                                  Weight           

 

     Synthroid            Yes                      = 1 tab,           [facility]

lori



     112 mcg      8-15                               PO, Daily,           l



     (0.112 mg)      19:35:                               # 90 tab,           He

rmann



     oral tablet      00                                 3              



                                                  Refill(s),           



                                                  Pharmacy:           



                                                  [facility] #[id number],           



                                                  180.34,           



                                                  cm,            



                                                  08/15/22           



                                                  14:16:00           



                                                  CDT,           



                                                  Height,           



                                                  113.182,           



                                                  kg,            



                                                  08/15/22           



                                                  14:16:00           



                                                  CDT,           



                                                  Weight           

 

     TADALAFIL      2022- No                                      [facility]



     ORAL      12                                         st



               10:04: 00:00                                         [facility]osp[facility]



               53   :00                                          l

 

     levothyroxi            Yes                                     [facility]

i



     ne        -                                              st



     (SYNTHROID)      09:50:                                              Hospit

a



     112 mcg      15                                                l



     tablet                                                        

 

     atorvastati            Yes                                     Method

i



     n calcium      7-                                              st



     (ATORVASTAT      09:50:                                              Hospit

a



     IN ORAL)      15                                                l

 

     levothyroxi            Yes                                     [facility]

i



     ne        7-12                                              st



     (SYNTHROID)      09:50:                                              Hospit

a



     112 mcg      15                                                l



     tablet                                                        

 

     atorvastati            Yes                                     Method

i



     n calcium      7-12                                              st



     (ATORVASTAT      09:50:                                              Hospit

a



     IN ORAL)      15                                                l

 

     tadalafiL      2022- No        [id number] 5mg  Q24H Take 1           

[facility]



     (CIALIS) 5      --                          tablet (5           st



     MG tablet      00:00: 04:59                          mg total)           Ho

spita



               00   :00                           by mouth           l



                                                  daily as           



                                                  needed for           



                                                  erectile           



                                                  dysfunctio           



                                                  n for up           



                                                  to 30           



                                                  days.           

 

     tadalafiL      2022- No        [id number] 5mg  Q24H Take 1           

[facility]



     (CIALIS) 5      7 08-12                          tablet (5           st



     MG tablet      00:00: 04:59                          mg total)           Ho

spita



               00   :00                           by mouth           l



                                                  daily as           



                                                  needed for           



                                                  erectile           



                                                  dysfunctio           



                                                  n for up           



                                                  to 30           



                                                  days.           

 

     carvediloL            Yes                                     [facility]



     12.5 mg      7-10                                              ity of



     tablet      00:00:                                              [address]

 

     metFORMIN            Yes                                     [facility]



     500 mg      7-10                                              ity of



     tablet      00:00:                                              [address]

 

     metFORMIN            Yes                                     [facility]



     (GLUCOPHAGE      7-10                                              st



     ) 500 mg      00:00:                                              [facility]ospita



     tablet      00                                                l

 

     carvediloL            Yes                                     [facility]



     (COREG)      7-10                                              st



     12.5 MG      00:00:                                              [facility]ospita



     tablet      00                                                l

 

     metFORMIN            Yes                                     Methodi



     (GLUCOPHAGE      7-10                                              st



     ) 500 mg      00:00:                                              [facility]



     tablet      00                                                l

 

     carvediloL            Yes                                     [facility]



     (COREG)      7-10                                              st



     12.5 MG      00:00:                                              [facility]



     tablet      00                                                l

 

     esomeprazol            Yes                      = 1 cap,           Me

moria



     e 40 mg      5-09                               PO, Daily,           l



     oral      21:45:                               # 90 cap           Neo



     delayed      00                                 3              



     release                                         Refill(s),           



     capsule                                         Pharmacy:           



                                                  [facility]           



                                                   #[id number],           



                                                  180.34,           



                                                  cm,            



                                                  22           



                                                  15:16:00           



                                                  CDT,           



                                                  Height,           



                                                  114.545,           



                                                  kg,            



                                                  22           



                                                  15:16:00           



                                                  CDT,           



                                                  Weight           

 

     Metformin      2021      Yes                      500 mg = 1           Me

moria



     hydrochlori      1-16                               tab, PO,           l



     de 500 MG      15:44:                               BID-Meals,           He

rmann



     Oral Tablet      00                                 # 180 tab,           



                                                  3              



                                                  Refill(s)           

 

     metFORMIN      2021      Yes                      500 mg = 1           Me

moria



     500 mg oral      1-16                               tab, PO,           l



     tablet      15:44:                               BID-Meals,           Isi

nn



               00                                 # 180 tab,           



                                                  3              



                                                  Refill(s)           

 

     atorvastati      2021      Yes                      = 1 tab,           Me

moria



     n 20 mg      1-01                               PO,            l



     oral tablet      21:13:                               Bedtime, #           

[place]



               00                                 90 tab, 3           



                                                  Refill(s),           



                                                  Pharmacy:           



                                                  [facility] #[id number],           



                                                  180.34,           



                                                  cm,            



                                                  21           



                                                  15:15:00           



                                                  CDT,           



                                                  Height,           



                                                  115, kg,           



                                                  21           



                                                  15:15:00           



                                                  CDT,           



                                                  Weight           

 

     empaglifloz      2021      Yes                      10 mg = 1           M

emoria



     in 10 MG      1-01                               tab, PO,           l



     Oral Tablet      21:13:                               QAM, # 30           H

ermann



     [Jardiance]      00                                 tab, 11           



                                                  Refill(s),           



                                                  Pharmacy:           



                                                  [facility] #[id number],           



                                                  180.34,           



                                                  cm,            



                                                  21           



                                                  15:15:00           



                                                  CDT,           



                                                  Height,           



                                                  115, kg,           



                                                  21           



                                                  15:15:00           



                                                  CDT,           



                                                  Weight           

 

     Levothyroxi            Yes                      = 1 tab,           Me

moria



     ne Sodium      9-01                               PO, Daily,           l



     0.112 MG      21:42:                               # 90 tab,           Herm

malik



     Oral Tablet      00                                 3              



     [Synthroid]                                         Refill(s),           



                                                  Pharmacy:           



                                                  [facility] #[id number],           



                                                  180.34,           



                                                  cm,            



                                                  21           



                                                  14:23:00           



                                                  CDT,           



                                                  Height,           



                                                  116.023,           



                                                  kg,            



                                                  21           



                                                  14:23:00           



                                                  CDT,           



                                                  Weight           

 

     tadalafil 5            Yes                      = 1 tab,           Me

moria



     mg oral      6-01                               PO, Daily,           l



     tablet      21:45:                               INSTR:FLORECITA           Isi

nn



               00                                 GN             



                                                  PROSTATIC           



                                                  HYPERPLASI           



                                                  A, # 90           



                                                  tab, 3           



                                                  Refill(s),           



                                                  Pharmacy:           



                                                  [facility]           



                                                  [id number],           



                                                  180.34,           



                                                  cm,            



                                                  21           



                                                  14:50:00           



                                                  CDT,           



                                                  Height,           



                                                  113.636,           



                                                  kg,            



                                                  21           



                                                  14:50:00           



                                                  CDT,           



                                                  Weight           

 

     Esomeprazol            Yes                      = 1 cap,           Me

moria



     e 40 MG      1-25                               PO, Daily,           l



     Enteric      14:24:                               # 90           [place]



     Coated      00                                 unknown           



     Capsule                                         unit, 3           



                                                  Refill(s),           



                                                  Pharmacy:           



                                                  [facility]           



                                                  [id number],           



                                                  180.34,           



                                                  cm,            



                                                  21           



                                                  14:40:00           



                                                  CST,           



                                                  Height,           



                                                  114.205,           



                                                  kg,            



                                                  21           



                                                  14:40:00           



                                                  CST,           



                                                  Weight           

 

     Levothyroxi            Yes                      = 1 tab,           Me

moria



     ne Sodium      9-09                               PO, Daily,           l



     0.112 MG      16:08:                               # 90 tab,           Herm

malik



     Oral Tablet      00                                 3              



     [Synthroid]                                         Refill(s),           



                                                  [facility],           



                                                  Pharmacy:           



                                                  [facility] STORE           



                                                  [id number],           



                                                  180.34,           



                                                  cm,            



                                                  20           



                                                  15:11:00           



                                                  CDT,           



                                                  Height,           



                                                  116.534,           



                                                  kg,            



                                                  20           



                                                  15:11:00           



                                                  CDT,           



                                                  Weight           

 

     tadalafil 5      -0      Yes                      = 1 tab,           Me

moria



     mg oral      6-09                               PO, Daily,           l



     tablet      16:26:                               # 90 tab,           [facility]

n



               13                                 Refill(s)           



                                                  3,             



                                                  INSTR:FLORECITA           



                                                  GN             



                                                  PROSTATIC           



                                                  HYPERPLASI           



                                                  A,             



                                                  [facility]arm[facility]           



                                                  [id number]           

 

     tadalafil 5      -      Yes                      5 mg = 1           Me

moria



     MG Oral      4-24                               tab, PO,           l



     Tablet      16:51:                               Daily,           Neo



     [Cialis]      00                                 Benign           



                                                  Prostatic           



                                                  Hyperplasi           



                                                  a, # 90           



                                                  tab, 3           



                                                  Refill(s),           



                                                  Pharmacy:           



                                                  [facility] #[id number]           

 

     atorvastati      2018      Yes                      20 mg = 1           M

emoria



     n 20 mg      2-18                               tab, PO,           l



     oral tablet      13:54:                               Bedtime, #           

[place]



               00                                 90 tab, 3           



                                                  Refill(s),           



                                                  Pharmacy:           



                                                  [facility] #[id number]           

 

     Levothyroxi            Yes                      112            [facility]

a



     ne Sodium      9-24                               microgram           l



     0.112 MG      20:58:                               = 1 tab,           Isi

nn



     Oral Tablet      00                                 PO, Daily,           



     [Synthroid]                                         Brand Name           



                                                  Medically           



                                                  Necessary,           



                                                  # 90 tab,           



                                                  3              



                                                  Refill(s),           



                                                  [facility],           



                                                  Pharmacy:           



                                                  [id number]           

 

     nebivolol 5      -0      Yes                      5 mg = 1           Me

moria



     MG Oral      6-15                               tab, PO,           l



     Tablet      15:23:                               Daily,           Neo



     [Bystolic]      54                                 **Please           



                                                  fax to [facility]           



                                                  [phone]**, #           



                                                  90 tab, 2           



                                                  Refill(s),           



                                                  Pharmacy[facility]           



                                                  [id number]           

 

     Promethazin            No                       Notes: Do           M

emoria



     e                                        not give           l



               20:08:                               IV push.           Neo



               00                                 (Same as:           



                                                  Phenergan)           

 

     Ondansetron            No                       Notes:           Marcos

lori



                                              (Same as:           l



               20:08:                               Zofran)           Neo



               00                                 ***            



                                                  MEDICATION           



                                                  WASTE ***           



                                                  Product           



                                                  Size: 4 mg           



                                                  Product           



                                                  Wasted:           



                                                  ___ mg           

 

     Dexamethaso            No                       Notes:           Marcos

lori



     ne                                       Concentrat           l



               20:08:                               ion:           Neo



                                                4mg/ml           

 

     Morphine            No                       Notes:           [facility]



                                              (Same           l



               20:08:                               as:MORPhin                                            e Sulfate)           

 

     Hydromorpho            No                       Notes:           Marcos

lori



     ne                                       Same as           l



               20:08:                               Dilaudid                                                           

 

     Acetaminoph            No                       Notes: Max           

[facility]



     en                                       acetaminop           l



               20:08:                               hen 4000           [place]



               00                                 mg/day (4           



                                                  gm/day).           



                                                  (Same as:           



                                                  Tylenol           



                                                  Extra           



                                                  Strength)           

 

     Naloxone            No                       Notes:           [facility]



                                              Same as           l



               20:08:                               Narcan                                                           

 

     Flumazenil            No                       Notes:           Memor

ia



                                              (Same as:           l



               20:08:                               Romazicon)                                                           

 

     Labetalol            No                       Notes:           Mem[facility]

a



                                              (Same as:           l



               20:08:                               Normodyne,           [place]                                 Trandate)           



                                                  Push over           



                                                  2 minutes           



                                                  Give bolus           



                                                  over 2-3           



                                                  minutes.           

 

     ANES            No                       Notes:           [facility]



     Enalaprilat                                     (Same as:           l



               20:08:                               Vasotec-IV                                            )              

 

     Hydromorpho            No                       Notes:           Marcos

lori



     ne                                       Same as           l



               20:01:                               Dilaudid                                                           

 

     Morphine            No                       Notes:           [facility]



                                              (Same           l



               20:01:                               as:MORPhin                                            e Sulfate)           

 

     acetaminoph            No                       Notes: Do           M

emoria



     en-codeine                                     not exceed           l



     #3        20:01:                               4gm/day of           Neo



                                                acetaminop           



                                                  hen. (Same           



                                                  as:            



                                                  Tylenol           



                                                  with           



                                                  Codeine #           



                                                  3)             

 

     Acetaminoph            No                       Notes: Do           M

emoria



     en        -                               not exceed           l



               20:01:                               4 gm/day.           [place]



                                                (Same as:           



                                                  Tylenol)           

 

     Acetaminoph            Yes                      1 - 2 tab,           

[facility]



     en 300 MG /                                     PO, Q4H,           l



     Codeine      19:59:                               PRN Pain,           Isi

nn



     Phosphate      00                                 X 14 day,           



     60 MG Oral                                         # 50 tab,           



     Tablet                                         0              



     [Tylenol                                         Refill(s)           



     with                                                        



     Codeine #4]                                                        

 

     Cephalexin            Yes                      500 mg = 1           M

emoria



     500 MG Oral                                     cap, PO,           l



     Capsule      19:59:                               QID, X 3           [facility]

n



     [Keflex]      00                                 day, # 12           



                                                  cap, 0           



                                                  Refill(s)           

 

     ondansetron            No                       Route: IV,           

[facility]



     (ANES)                                     Drug form:           l



               19:51:                               INJ, ONCE,                                            Stop date:           



                                                  17           



                                                  14:51:00           



                                                  CDT            

 

     dexamethaso            No                       Route: IV,           

[facility]



     ne (ANES)                                     Drug form:           l



               19:50:                               INJ, ONCE,                                            Stop date:           



                                                  17           



                                                  14:50:00           



                                                  CDT            

 

     ceFAZolin            No                       Route: IV,           Me

moria



     (ANES)                                     Drug form:           l



               19:36:                               INJ, ONCE,                                            Stop date:           



                                                  17           



                                                  14:36:00           



                                                  CDT            

 

     propofol            No                       Route: IV,           [facility]

[facility]



     (ANES)                                     Drug form:           l



               19:36:                               INJ, ONCE,                                            Stop date:           



                                                  17           



                                                  14:36:00           



                                                  CDT            

 

     fentaNYL            No                       Route: IV,           [facility]

[facility]



     (ANES)                                     Drug form:           l



               19:35:                               INJ, ONCE,                                            Stop date:           



                                                  17           



                                                  14:35:00           



                                                  CDT            

 

     lidocaine            No                       Route: IV,           Me

moria



     (ANES)                                     Drug form:           l



               19:35:                               INJ, ONCE,                                            Stop date:           



                                                  17           



                                                  14:35:00           



                                                  CDT            

 

     midazolam            No                       Route: IV,           Me

moria



     (ANES)                                     Drug form:           l



               19:30:                               SOLN,           



               00                                 ONCE, Stop           



                                                  date:           



                                                  17           



                                                  14:30:00           



                                                  CDT            

 

     LR 1000 mL            No                       Route: IV,           M

em[facility]



     INJ (ANES)                                     Total           l



               18:54:                               Volume:           [place]



               00                                 1,000,           



                                                  Start           



                                                  date:           



                                                  17           



                                                  13:54:00           



                                                  CDT, Stop           



                                                  date:           



                                                  17           



                                                  14:54:00           



                                                  CDT            

 

     Tylenol            No                       1,000 mg,           [facility]

ia



                                              Route: PO,           l



               18:00:                               ONCALL,                                            Dosing           



                                                  Weight           



                                                  115.455,           



                                                  kg, Start           



                                                  date:           



                                                  17           



                                                  13:00:00           



                                                  CDT            

 

     Lidocaine            No                       Notes:           [facility]

a



     Hydrochlori                                     Preservati           l



     de 10 MG/ML      16:00:                               ve free.           He

rmann



     Injectable                                       (Same as:           



     Solution                                         Xylocaine           



                                                  MPF)           

 

     Calcium            No                       1,000 mL,           [facility]

ia



     Chloride                                     Rate: 25           l



     0.0014      15:40:                               ml/hr,           [facility]



     MEQ/ML /      00                                 Infuse           



     Potassium                                         over: 40           



     Chloride                                         hr, Route:           



     0.004                                         IV, Dosing           



     MEQ/ML /                                         Weight           



     Sodium                                         113.636           



     Chloride                                         kg, Total           



     0.103                                         Volume:           



     MEQ/ML /                                         1,000,           



     Sodium                                         Start           



     Lactate                                         date:           



     0.028                                         17           



     MEQ/ML                                         10:40:00           



     Injectable                                         CDT,           



     Solution                                         Duration:           



                                                  30 day,           



                                                  Stop date:           



                                                  17           



                                                  10:39:00           



                                                  CDT            

 

     Neurontin            No                       Notes:           [facility]

a



                                              (Same as:           l



               11:00:                               Neurontin)                                                           

 

     vancomycin            No                        2001 mg:           Me

moria



     + sodium                                     infuse           l



     chloride      11:00:                               over 2.5           Isi

nn



     0.9% 500 mL      00                                 hours ***           



     INJ (for IV                                         MEDICATION           



     set) 500 mL                                         WASTE ***           



                                                  Product           



                                                  Size: 1000           



                                                  mg Product           



                                                  Wasted:           



                                                  ___ mg           

 

     ceFAZolin            No                       Notes:           [facility]

a



                                              Same as:           l



               11:00:                               Ancef                                                           

 

     BD Normal            No                       Notes:           [facility]

a



     Saline                                     (Same as:           l



     Flush      11:00:                               BD                                              Posiflush)           

 

     oxyCONTIN            No                       Notes: Do           Mem

oria



                                              not crush           l



               11:00:                               or chew.                                            (Same as:           



                                                  OxyContin)           

 

     Centrum            Yes                      1 tab, PO,           [facility]

lori



     Silver      -21                               Daily, 0           l



     Men's      19:10:                               Refill(s)                                                           

 

     acetaminoph            Yes                      1,000 mg,           M

emoria



     en        -21                               PO, Daily,           l



               19:10:                               as needed           [place]



               00                                 for pain,           



                                                  0              



                                                  Refill(s)           

 

     Acetaminoph      2017-0      Yes                      0              [facility]

a



     en        4-21                               Refill(s)           l



               19:10:                                              [facility]



               00                                                







Immunizations







           Ordered Immunization Filled Immunization Date       Status     Commen

ts   Source



           Name       Name                                        

 

           influenza virus            2022-10-03 Completed             [facility] 

[facility]



           vaccine, inactivated            00:00:00                         

 

           PFIZER COVID-19 MRNA            2022 Completed             [facility]

[facility]



           VACCINATION            00:00:00                         [facility]

 

           PFIZER COVID-19 MRNA            2022 Completed             [facility]

[facility]



           VACCINATION            00:00:00                         [facility]

 

           SARS-CoV-2COVID-19            2022 Completed             [facility]

rial [place]



           [id number]            00:00:00                         

 

           PFIZER COVID-19 MRNA            2021 Completed             [facility]

[facility]



           VACCINATION            00:00:00                         Hospital

 

           PFIZER COVID-19 MRNA            2021 Completed             [facility]

[facility]



           VACCINATION            00:00:00                         [facility]

 

           [facility] Completed             [facility]

riamena Larson



           [id number]            00:00:00                         

 

           influenza virus            2021-10-04 Completed             [facility] 

[facility]



           vaccine, inactivated            00:00:00                         

 

           MODERNA COVID-19            2021 Completed             [facility]

[facility]



           MRNA VACCINATION            00:00:00                         Hospital

 

           MODERNA COVID-19            2021 Completed             [facility]

t



           MRNA VACCINATION            00:00:00                         [facility]

 

           SARS-CoV-2COVID-[facility]            2021 Completed             [facility]

donnell Larson



           [id number]<westfall            00:00:00                         



           p>1</sup>                                              

 

           SARS-CoV-2COVID-19            2021 Completed             [facility]

donnell Larson



           [id number]<westfall            00:00:00                         



           p>3</sup>                                              

 

           MODERNA COVID-19            2021 Completed             [facility]

t



           MRNA VACCINATION            00:00:00                         Hospital

 

           MODERNA COVID-19            2021 Completed             [facility]

t



           MRNA VACCINATION            00:00:00                         [facility]

 

           SARS-CoV-2COVID-19            2021 Completed             [facility]

donnell Larson



           [id number]<westfall            00:00:00                         



           p>2</sup>                                              

 

           SARS-CoV-2COVID-19            2021 Completed             [facility]



           [id number]<westfall            00:00:00                         



           p>4</sup>                                              

 

           influenza virus            2020 Completed             [facility] 

[place]



           vaccine,              00:00:00                         



           inactivated<sup>3</s                                             



           up>                                                    

 

           influenza virus            2020 Completed             [facility] 

[place]



           vaccine,              00:00:00                         



           inactivated<sup>1</s                                             



           up>                                                    

 

           influenza virus            2019-10-30 Completed             [facility] 

[place]



           vaccine,              00:00:00                         



           inactivated<sup>1</s                                             



           up>                                                    

 

           influenza virus            2019-10-30 Completed             [facility] 

[place]



           vaccine,              00:00:00                         



           inactivated<sup>4</s                                             



           up>                                                    

 

           influenza virus            2019-10-30 Completed             [facility] 

[place]



           vaccine,              00:00:00                         



           inactivated<sup>2</s                                             



           up>                                                    







Vital Signs







             Vital Name   Observation Time Observation Value Comments     Source

 

             Systolic blood 2023 15:50:00 135 mm[Hg]                [facility]

sity of



             pressure                                            [facility]

 

             Diastolic blood 2023 15:50:00 83 mm[Hg]                 Unive

rsity of



             [facility]

 

             Heart rate   2023 15:50:00 78 /min                   [facility]

 

             Body temperature 2023 15:50:00 36.72 Kassidy                 [facility]

 

             Respiratory rate 2023 15:50:00 18 /min                   [facility]

 

             Body height  2023 15:50:00 180.3 cm                  [facility]

 

             Body weight  2023 15:50:00 111.63 kg                 [facility]

 

             BMI          2023 15:50:00 34.32 kg/m2               [facility]

 

             Oxygen saturation in 2023 15:50:00 98 /min                   

[facility]



             Arterial blood by                                        [facility]

[facility]



             Pulse oximetry                                        [facility]

 

             Temperature Oral (F) 2023-05-15 19:01:00 97.5 F                    

[facility]

 

             Heart Rate   2023-05-15 19:01:00                           [facility]

 [place]

 

             Systolic (mm Hg) 2023-05-15 19:01:00                           [facility]ost[facility] jacobo [place]

 

             Diastolic (mm Hg) 2023-05-15 19:01:00                           [facility]

[facility] [place]

 

             Height       2023-05-15 19:01:00 5 [ft_i]                  [facility]

 [facility]

 

             Weight       2023-05-15 19:01:00                           [facility]

 [facility]

 

             BMI Calculated 2023-05-15 19:01:00                           [facility]

al [place]

 

             Heart Rate   2023 20:21:00                           [facility]

 [facility]

 

             Systolic (mm Hg) 2023 20:21:00                           [facility]

rial [place]

 

             Diastolic (mm Hg) 2023 20:21:00                           [facility]

[facility] [place]

 

             Height       2023 20:21:00 5 [ft_i]                  [facility]

 [place]

 

             Weight       2023 20:21:00                           [facility]

 [facility]

 

             BMI Calculated 2023 20:21:00                           [facility]

al [place]

 

             Temperature Oral (F) 2022 20:15:00 97.8 F                    

[facility] [place]

 

             Heart Rate   2022 20:15:00                           [facility]

 [facility]

 

             Systolic (mm Hg) 2022 20:15:00                           [facility]

rial [place]

 

             Diastolic (mm Hg) 2022 20:15:00                           [facility]

[facility] [place]

 

             Height       2022 20:15:00 5 [ft_i]                  [facility]

 [facility]

 

             Weight       2022 20:15:00                           [facility]

 [facility]

 

             BMI Calculated 2022 20:15:00                           [facility]

al [facility]

 

             Temperature Oral (F) 2022 18:12:00 98.0 F                    

[facility]

 

             Heart Rate   2022 18:12:00                           [facility]

 [facility]

 

             Systolic (mm Hg) 2022 18:12:00                           [facility]

rial [place]

 

             Diastolic (mm Hg) 2022 18:12:00                           [facility]

[facility]

 

             Height       2022 18:12:00 5 [ft_i]                  [facility]

 [place]

 

             Weight       2022 18:12:00                           [facility]

 [facility]

 

             BMI Calculated 2022 18:12:00                           [facility]

al [facility]

 

             Temperature Oral (F) 2022-08-15 19:16:00 98.1 F                    

[facility]

 

             Heart Rate   2022-08-15 19:16:00                           [facility]

 [facility]

 

             Systolic (mm Hg) 2022-08-15 19:16:00                           [facility]

[facility]

 

             Diastolic (mm Hg) 2022-08-15 19:16:00                           [facility]

[facility] [place]

 

             Height       2022-08-15 19:16:00 180.34 cm                 [facility]

 [place]

 

             Weight       2022-08-15 19:16:00                           [facility]

 [place]

 

             BMI Calculated 2022-08-15 19:16:00                           [facility]

al [place]

 

             Temperature Oral (F) 2022 20:16:00 98.4 F                    

[facility] [place]

 

             Heart Rate   2022 20:16:00                           [facility]

 [place]

 

             Systolic (mm Hg) 2022 20:16:00                           [facility]

[facility]

 

             Diastolic (mm Hg) 2022 20:16:00                           [facility]

[facility] [place]

 

             Height       2022 20:16:00 180.34 cm                 [facility]

 [facility]

 

             Weight       2022 20:16:00                           [facility]

 [facility]

 

             BMI Calculated 2022 20:16:00                           [facility]

al [facility]

 

             Temperature Oral (F) 2022 20:41:00 97.9 F                    

[facility]

 

             Heart Rate   2022 20:41:00                           [facility]

 [place]

 

             Systolic (mm Hg) 2022 20:41:00                           [facility]

rial [place]

 

             Diastolic (mm Hg) 2022 20:41:00                           [facility]

[facility] [place]

 

             Height       2022 20:41:00 180.34 cm                 [facility]

 [facility]

 

             Weight       2022 20:41:00                           [facility]

 [facility]

 

             BMI Calculated 2022 20:41:00                           [facility]

al [place]

 

             Temperature Oral (F) 2021 18:23:00 98.0 F                    

[facility]

 

             Heart Rate   2021 18:23:00                           [facility]

 [facility]

 

             Systolic (mm Hg) 2021 18:23:00                           [facility]

[facility]

 

             Diastolic (mm Hg) 2021 18:23:00                           [facility]

[facility] [place]

 

             Height       2021 18:23:00 180.34 cm                 [facility]

 [facility]

 

             Weight       2021 18:23:00                           [facility]

 [place]

 

             BMI Calculated 2021 18:23:00                           [facility]

al [place]

 

             Temperature Oral (F) 2021 20:15:00 98.3 F                    

[facility]

 

             Heart Rate   2021 20:15:00                           [facility]

 [place]

 

             Systolic (mm Hg) 2021 20:15:00                           [facility]

rial [place]

 

             Diastolic (mm Hg) 2021 20:15:00                           [facility]

[facility] [place]

 

             Height       2021 20:15:00 180.34 cm                 [facility]

 [facility]

 

             Weight       2021 20:15:00                           [facility]

 [place]

 

             BMI Calculated 2021 20:15:00                           [facility]

al [facility]

 

             Systolic (mm Hg) 2021 19:23:00                           [facility]

rial [place]

 

             Diastolic (mm Hg) 2021 19:23:00                           [facility]

[facility]

 

             Heart Rate   2021 19:23:00                           [facility]

 [facility]

 

             Height       2021 19:23:00 180.34 cm                 [facility]

 [place]

 

             Weight       2021 19:23:00                           [facility]

 [facility]

 

             BMI Calculated 2021 19:23:00                           [facility]

al [place]

 

             Systolic (mm Hg) 2021-06-15 15:19:00                           [facility]

[facility] [place]

 

             Diastolic (mm Hg) 2021-06-15 15:19:00                           [facility]

[facility] [place]

 

             Heart Rate   2021-06-15 15:19:00                           [facility]

 [facility]

 

             Height       2021-06-15 15:19:00 180.34 cm                 [facility]

 [facility]

 

             Weight       2021-06-15 15:19:00                           [facility]

 [facility]

 

             BMI Calculated 2021-06-15 15:19:00                           [facility]

al [place]

 

             Systolic (mm Hg) 2021 19:50:00                           [facility]

[facility] [place]

 

             Diastolic (mm Hg) 2021 19:50:00                           [facility]

[facility] [place]

 

             Heart Rate   2021 19:50:00                           [facility]

 [place]

 

             Temperature Oral (F) 2021 19:50:00 98.5 F                    

[facility]

 

             Height       2021 19:50:00 180.34 cm                 [facility]

 [place]

 

             Weight       2021 19:50:00                           [facility]

 [place]

 

             BMI Calculated 2021 19:50:00                           [facility]

al [place]

 

             Systolic (mm Hg) 2021 20:40:00                           [facility]

rial [place]

 

             Diastolic (mm Hg) 2021 20:40:00                           [facility]

[facility]

 

             Heart Rate   2021 20:40:00                           [facility]

 [facility]

 

             Height       2021 20:40:00 180.34 cm                 [facility]

 [place]

 

             Weight       2021 20:40:00                           [facility]

 [facility]

 

             BMI Calculated 2021 20:40:00                           [facility]

al [place]

 

             Systolic (mm Hg) 2020-10-05 19:45:00                           [facility]

[facility]

 

             Diastolic (mm Hg) 2020-10-05 19:45:00                           [facility]

[facility]

 

             Heart Rate   2020-10-05 19:45:00                           [facility]

 [place]

 

             Height       2020-10-05 19:45:00 180.34 cm                 [facility]

 [facility]

 

             Weight       2020-10-05 19:45:00                           [facility]

 [facility]

 

             BMI Calculated 2020-10-05 19:45:00                           [facility]

[facility]

 

             Systolic (mm Hg) 2020 20:11:00                           [facility]

[facility] [place]

 

             Diastolic (mm Hg) 2020 20:11:00                           [facility]

[facility] [place]

 

             Heart Rate   2020 20:11:00                           [facility]

 [place]

 

             Temperature Oral (F) 2020 20:11:00 98.2 F                    

[facility] [place]

 

             Height       2020 20:11:00 180.34 cm                 [facility]

 [place]

 

             Weight       2020 20:11:00                           [facility]

 [place]

 

             BMI Calculated 2020 20:11:00                           [facility]

[facility]

 

             Systolic (mm Hg) 2020 18:57:00                           [facility]

[facility]

 

             Diastolic (mm Hg) 2020 18:57:00                           [facility]

[facility] [place]

 

             Heart Rate   2020 18:57:00                           [facility]

 [place]

 

             Temperature Oral (F) 2020 18:57:00 98.0 F                    

[facility]

 

             Height       2020 18:57:00 180.34 cm                 [facility]

 [facility]

 

             Weight       2020 18:57:00                           [facility]

 [facility]

 

             BMI Calculated 2020 18:57:00                           [facility]

[facility] [place]

 

             Systolic (mm Hg) 2020-06-15 16:08:00                           [facility]

[facility] [place]

 

             Diastolic (mm Hg) 2020-06-15 16:08:00                           [facility]

[facility] [place]

 

             Heart Rate   2020-06-15 16:08:00                           [facility]

 [facility]

 

             Height       2020-06-15 16:08:00 180.34 cm                 [facility]

 [place]

 

             Weight       2020-06-15 16:08:00                           [facility]

 [place]

 

             BMI Calculated 2020-06-15 16:08:00                           [facility]

al [place]

 

             Systolic (mm Hg) 2020 20:00:00                           [facility]

[facility] [place]

 

             Diastolic (mm Hg) 2020 20:00:00                           [facility]

[facility] [place]

 

             Heart Rate   2020 20:00:00                           [facility]

 [place]

 

             Temperature Oral (F) 2020 20:00:00 98.9 F                    

[facility] [place]

 

             Height       2020 20:00:00 180.34 cm                 [facility]

 [facility]

 

             Weight       2020 20:00:00                           [facility]

 [place]

 

             BMI Calculated 2020 20:00:00                           [facility]

[facility]

 

             Systolic (mm Hg) 2019 19:58:00                           [facility]

[facility] [place]

 

             Diastolic (mm Hg) 2019 19:58:00                           [facility]

[facility]

 

             Heart Rate   2019 19:58:00                           [facility]

 [place]

 

             Temperature Oral (F) 2019 19:58:00 98.3 F                    

[facility]

 

             Height       2019 19:58:00 180.34 cm                 [facility]

 [place]

 

             BMI Calculated 2019 18:08:00                           [facility]

[facility]

 

             Weight       2019 18:08:00                           [facility]

 [place]

 

             Height       2019 18:08:00 180.34 cm                 [facility]

 [place]

 

             Heart Rate   2019 18:08:00                           [facility]

 [place]

 

             Temperature Oral (F) 2019 18:08:00 98.3 F                    

[facility] [place]

 

             Systolic (mm Hg) 2019 18:08:00                           [facility]

[facility] [place]

 

             Diastolic (mm Hg) 2019 18:08:00                           [facility]

[facility] [place]

 

             Weight       2019 18:08:00                           [facility]

 [facility]

 

             Height       2019 18:08:00 180.34 cm                 [facility]

 [facility]

 

             BMI Calculated 2019 18:08:00                           [facility]

[facility]

 

             Temperature Oral (F) 2019 18:08:00 97.8 F                    

[facility] [place]

 

             Heart Rate   2019 18:08:00                           [facility]

 [facility]

 

             Systolic (mm Hg) 2019 18:08:00                           [facility]

[facility]

 

             Diastolic (mm Hg) 2019 18:08:00                           [facility]

[facility] [place]

 

             Weight       2019 17:54:00                           [facility]

 [place]

 

             Heart Rate   2019 17:54:00                           [facility]

 [facility]

 

             Systolic (mm Hg) 2019 17:54:00                           [facility]

[facility]

 

             Diastolic (mm Hg) 2019 17:54:00                           [facility]

[facility] [place]

 

             Weight       2019 16:14:00                           [facility]

 [facility]

 

             BMI Calculated 2019 16:14:00                           [facility]

[facility]

 

             Height       2019 16:14:00 180.34 cm                 [facility]

 [facility]

 

             Temperature Oral (F) 2019 16:14:00 98.0 F                    

[facility] [place]

 

             Heart Rate   2019 16:14:00                           [facility]

 [facility]

 

             Systolic (mm Hg) 2019 16:14:00                           [facility]

[facility] [place]

 

             Diastolic (mm Hg) 2019 16:14:00                           [facility]

[facility]

 

             BMI Calculated 2019 19:12:00                           [facility]

[facility] [place]

 

             Weight       2019 19:12:00                           [facility]

 [facility]

 

             Height       2019 19:12:00 180.34 cm                 [facility]

 [place]

 

             Temperature Oral (F) 2019 19:12:00 98.1 F                    

[facility]

 

             Heart Rate   2019 19:12:00                           [facility]

 [facility]

 

             Systolic (mm Hg) 2019 19:12:00                           [facility]

[facility] [place]

 

             Diastolic (mm Hg) 2019 19:12:00                           [facility]

[facility]

 

             Height       2018-10-29 18:31:00 180.34 cm                 [facility]

 [place]

 

             BMI Calculated 2018-10-29 18:31:00                           [facility]

[facility]

 

             Weight       2018-10-29 18:31:00                           [facility]

 [place]

 

             Temperature Oral (F) 2018-10-29 18:31:00 98.5 F                    

[facility]

 

             Heart Rate   2018-10-29 18:31:00                           [facility]

 [place]

 

             Systolic (mm Hg) 2018-10-29 18:31:00                           [facility]

[facility]

 

             Diastolic (mm Hg) 2018-10-29 18:31:00                           [facility]

[facility]

 

             Height       2018 14:34:00 180.34 cm                 [facility]

 [place]

 

             Heart Rate   2018 14:34:00                           [facility]

 [place]

 

             Temperature Oral (F) 2018 14:34:00 98.0 F                    

[facility] [place]

 

             BMI Calculated 2018 14:34:00                           [facility]

[facility] [place]

 

             Weight       2018 14:34:00                           [facility]

 [place]

 

             Systolic (mm Hg) 2018 14:34:00                           [facility]

[facility] [place]

 

             Diastolic (mm Hg) 2018 14:34:00                           [facility]

[facility]

 

             Heart Rate   2018 19:01:00                           [facility]

 [facility]

 

             Systolic (mm Hg) 2018 19:01:00                           [facility]

[facility]

 

             Diastolic (mm Hg) 2018 19:01:00                           [facility]

[facility] [place]

 

             Weight       2018 19:01:00                           [facility]

 [facility]

 

             BMI Calculated 2018 21:43:00                           [facility]

[facility] [place]

 

             Weight       2018 21:43:00                           [facility]

 [facility]

 

             Height       2018 21:43:00 180.34 cm                 [facility]

 [facility]

 

             Temperature Oral (F) 2018 21:43:00 98.1 F                    

[facility]

 

             Respitory Rate 2018 21:43:00                           [facility]

[facility]

 

             Heart Rate   2018 21:43:00                           [facility]

 [place]

 

             Systolic (mm Hg) 2018 21:43:00                           [facility]

[facility]

 

             Diastolic (mm Hg) 2018 21:43:00                           [facility]

[facility]

 

             Height       2018 16:52:00 182.88 cm                 [facility]

 [place]

 

             Weight       2018 16:52:00                           [facility]

 [place]

 

             BMI Calculated 2018 16:52:00                           [facility]

[facility]

 

             Heart Rate   2018 16:52:00                           [facility]

 [facility]

 

             Temperature Oral (F) 2018 16:52:00 98.4 F                    

[facility] [place]

 

             Systolic (mm Hg) 2018 16:52:00                           [facility]

[facility]

 

             Diastolic (mm Hg) 2018 16:52:00                           [facility]

[facility]

 

             Height       2017 15:30:00 180.34 cm                 [facility]

 [facility]

 

             BMI Calculated 2017 15:30:00                           [facility]

[facility] [place]

 

             Weight       2017 15:30:00                           [facility]

 [place]

 

             Heart Rate   2017 15:30:00                           [facility]

 [place]

 

             Temperature Oral (F) 2017 15:30:00 98.2 F                    

[facility] [place]

 

             Systolic (mm Hg) 2017 15:30:00                           [facility]

[facility]

 

             Diastolic (mm Hg) 2017 15:30:00                           [facility]

[facility] [place]

 

             Systolic (mm Hg) 2017 21:15:00                           [facility]

[facility]

 

             Diastolic (mm Hg) 2017 21:15:00                           [facility]

[facility] [place]

 

             Respitory Rate 2017 20:45:00                           [facility]

[facility] [place]

 

             Systolic (mm Hg) 2017 20:45:00                           [facility]

[facility] [place]

 

             Diastolic (mm Hg) 2017 20:45:00                           [facility]

[facility] [place]

 

             Respitory Rate 2017 20:30:00                           [facility]

[facility] [place]

 

             Systolic (mm Hg) 2017 20:30:00                           [facility]

[facility] [place]

 

             Diastolic (mm Hg) 2017 20:30:00                           [facility]

[facility] [place]

 

             Respitory Rate 2017 20:15:00                           [facility]

[facility] [place]

 

             Heart Rate   2017 15:50:00                           [facility]

 [facility]

 

             Weight       2017 15:30:00                           [facility]

 [place]

 

             BMI Calculated 2017 15:30:00                           [facility]

[facility]

 

             Height       2017 18:38:00 180.34 cm                 [facility]

 [facility]







Procedures







                Procedure       Date / Time     Performing Clinician Source



                                Performed                       

 

                ASSIGNMENT OF BENEFITS 2023 15:39:16 Doctor Unassigned, No

 [facility]

 

                [id number]         2023-01-10 15:45:07 Nas Castro          

 

                POC URINALYSIS DIPSTICK 2023-01-10 15:39:01 Estefani [facility]



                                                [place]          

 

                [facility]:12[facility]



                ANTIGEN                         [place]          

 

                POC URINALYSIS DIPSTICK 2022 14:54:00 Estefani [facility]



                                                [place]          

 

                [id number]         2022 14:53:00 Nas Castro          

 

                Diabetic retinal eye 2021-11-15 06:00:00                 Leslieoria

mena Larson



                exam<sup>1</sup>                                 

 

                Measurement of  2021-06-15 15:26:00                 Sunny acosta



                post-voiding residual                                 



                urine and/or bladder                                 



                capacity by ultrasound,                                 



                non-imaging                                     

 

                Diabetic retinal eye 2020-10-27 05:00:00                 Noam [facility]



                exam<sup>2</sup>                                 

 

                Diabetic retinal eye 2018-10-30 05:00:00                 Noam Larson



                exam                                            

 

                Diabetic foot   2017 00:00:00                 [facility] 

acosta



                examination                                     

 

                Rotator cuff repair 2013 06:00:00                 [facility]

 [place]

 

                Colonoscopy     2012 05:00:00                 [facility] 

[place]

 

                Vasectomy                                       [facility]

 

                Splenectomy                                     [facility]

 

                Biopsy                                          [facility]

 

                Tooth<sup>4</sup>                                 [facility] Isi

nn

 

                Arthroscopy of knee                                 Sunny acosta







Plan of Care







             Planned Activity Planned Date Details      Comments     Source

 

             Future Scheduled 2023   Screening for              [facility]



             Test         12:23:13     malignant neoplasm of              



                                       colon (procedure)              



                                       [code = 304415924]              

 

             Future Scheduled 2023   Screening for              [facility]



             Test         12:23:13     malignant neoplasm of              



                                       colon (procedure)              



                                       [code = 257772913]              

 

             Future Scheduled 2023   Screening for              [facility]



             Test         12:23:13     malignant neoplasm of              



                                       colon (procedure)              



                                       [code = 699057558]              

 

             Future Scheduled 2023   Hepatitis C screening              [facility]



             Test         12:23:13     (procedure) [code =              



                                       761176681]                

 

             Future Scheduled 2023   Screening for              [facility]



             Test         12:23:13     malignant neoplasm of              



                                       colon (procedure)              



                                       [code = 774060245]              

 

             Future Scheduled 2023   Screening for              [facility]



             Test         12:23:13     malignant neoplasm of              



                                       colon (procedure)              



                                       [code = 925048261]              

 

             Future Scheduled 2023   SHINGLES VACCINES (1              [facility]



             Test         12:23:13     of 2) [code = SHINGLES              



                                       VACCINES (1 of 2)]              

 

             Future Scheduled 2023   65+ PNEUMOCOCCAL              [facility]



             Test         12:23:13     VACCINE (1 - PCV)              



                                       [code = 65+               



                                       PNEUMOCOCCAL VACCINE              



                                       (1 - PCV)]                

 

             Future Scheduled 2023   COVID-19 VACCINE (5 -              [facility]



             Test         12:23:13     Moderna series) [code              



                                       = COVID-19 VACCINE (5              



                                       - Moderna series)]              

 

             Future Scheduled 2023   INFLUENZA VACCINE              [facility]

[facility]



             Test         12:23:13     [code = INFLUENZA              



                                       VACCINE]                  

 

             Future Scheduled 2022   HEPATITIS B VACCINES              Met

[facility]



             Test         02:23:28     (1 of 3 - 3-dose              



                                       series) [code =              



                                       HEPATITIS B VACCINES              



                                       (1 of 3 - 3-dose              



                                       series)]                  

 

             Future Scheduled 2022   Hepatitis C screening              [facility]



             Test         02:23:28     (procedure) [code =              



                                       451456703]                

 

             Future Scheduled 2022   COLONOSCOPY SCREENING              [facility]



             Test         02:23:28     [code = COLONOSCOPY              



                                       SCREENING]                

 

             Future Scheduled 2022   SHINGLES VACCINES (1              Met

[facility]



             Test         02:23:28     of 2) [code = SHINGLES              



                                       VACCINES (1 of 2)]              

 

             Future Scheduled 2022   65+ [facility]OCOCCAL              [facility]



             Test         02:23:28     VACCINE (1 - PCV)              



                                       [code = 65+               



                                       PNEUMOCOCCAL VACCINE              



                                       (1 - PCV)]                

 

             Future Scheduled 2022   INFLUENZA VACCINE              Method

[facility]



             Test         02:23:28     [code = INFLUENZA              



                                       VACCINE]                  

 

             Future Scheduled 2022   COVID-19 VACCINE (5 -              [facility]



             Test         02:23:28     Booster for Moderna              



                                       series) [code =              



                                       COVID-19 VACCINE (5 -              



                                       Booster for Moderna              



                                       series)]                  







Encounters







        Start   End     Encounter Admission Attending Care    Care    Encounter 

Source



        Date/Time Date/Time Type    Type    Clinicians Facility Department ID   

   

 

        2023 Outpatient                 TERESSA GANNON    [id number]

[facility]



        13:15:00 13:15:00                                         77      mena Larson

 

        2023 Outpatient                 RASHID GANNON    [id number]

[facility]



        13:50:00 13:50:00                                         83      mena Larson

 

        2023 Outpatient                 RASHID GANNON    [id number]

[facility]



        12:00:00 12:00:00                                         84      mena Larson

 

        2023 Outpatient                 RASHID GANNON    [id number]

[facility]



        08:20:00 08:20:00                                         87      mena Larson

 

        2023 Outpatient                 RASHID GANNON    [id number]

[facility]



        13:50:00 13:50:00                                         85      mena Larson

 

        2023 Outpatient                 [facility]    [facility]    [id number]

[facility]



        08:00:00 08:00:00                                         86      l



                                                                        [place]

 

        2023 Urgent          Deepa Graham [facility]    [id number]

[id number] [facility]



        10:20:00 10:40:00 Care            Unknown, Attending HEALTH  [id number]

         ity of



                                                [place] [id number]         Rusty

as



                                                CORKY?BLEA [phone]         [address]



                                                MEDICAL                 



                                                OFFICE                  



                                                BUILDING                 

 

        2023 Outpatient R       RICHELLE   [facility]    [id number]

[id number] [facility]



        10:20:00 10:20:00                 DEEPA                          [facility]

 

        2023 Orders          Doctor  KENTRELL    [id number]

[id number] [facility]



        00:00:00 00:00:00 Only            Unassigned, SIRISHA   [id number]       

  ity of



                                        [facility] [id number]         Rusty

as



                                                        [phone]         [address]

 

        2023 Ambulatory                 [facility]    [facility]    [id number]

[facility]



        14:40:00 14:40:00 Pre-Reg                         Gastroenter 82      l



                                                        kayden Rojo         

 

        2023 Outpatient                 [facility]    [facility]    [id number]

[facility]



        09:40:00 09:40:00                                         82      l



                                                                        [place]

 

        2023 Out[facility]         [facility]    [id number]

365 



        09:40:00 09:40:00                 Nadim Musa                 82      

 

        2023-05-15 2023 Outpatient                 [facility]    [facility]    [id number]

[facility]



        19:10:00 04:59:59                                 Internal 80      l



                                                        Medicine         [facility] Rojo         

 

        2023-05-15 2023-05-15 [id number]

[id number] 



        14:10:00 23:59:59                 , Giuseppe Vidal                          

 

        2023-05-15 2023-05-15 Outpatient                 [facility]    [facility]    [id number]

[facility]



        14:10:00 14:10:00                                         80      mena Larson

 

        2023-05-10 2023 Between                 [facility]    [facility]    [id number]

 [facility]



        10:54:22 10:54:22 Visit                           Internal 56      l



                                                        Medicine         [place]



                                                        Rojo         

 

        2023-05-10 2023 Outpatient                 [facility]    [id number]

[id number] 



        05:54:22 05:54:22                                         56      

 

        2023 Outpatient                 [facility]    [facility]    [id number]

[facility]



        13:15:00 13:15:00                                         81      mena



                                                                        [place]

 

        2023 Outpatient                 [facility]    [facility]    [id number]

[facility]



        20:30:00 05:59:59                                 Internal 78      l



                                                        Medicine         [place]



                                                        Rojo         

 

        [facility]23 [id number]

[id number] 



        14:30:00 23:59:59                 , Giuseppe                 Alicia Vidal                          

 

        2023 Outpatient                 [facility]    [facility]    [id number]

365 [facility]



        14:30:00 14:30:00                                         78      l



                                                                        [place]

 

        2023 2023-02-10 Between                 IE    MG    [id number]

 Memoria



        13:33:53 13:33:53 Visit                           Internal 55      l



                                                        Medicine         [place]



                                                        [place]         

 

        2023 2023-02-10 Outpatient                 [facility]    [facility]    [id number]

375 



        07:33:53 07:33:53                                         55      

 

        2023 Outpatient                 [facility]    [facility]    [id number]

365 [facility]



        12:15:00 12:15:00                                         79      mena



                                                                        [place]

 

        2023 Phone                   [facility]    MG    [id number]

 Memoria



        20:06:52 05:59:59 Message                         Internal 17      l



                                                        Medicine         [place]         

 

        2023 Outpatient                 [facility]    [facility]    [id number]

355 



        14:06:52 23:59:59                                         17      

 

        2023-01-10 2023-01-10 Office          Malcolm Castro2.840.1 [id number]

9008 [facility]



        09:45:00 09:55:30 Visit           Nas 38728.1.1         [address]     [address] Chin  3.430.2.7                 Hospit

a



                                                .3.600101                 l



                                                .8                      

 

        2023-01-10 2023-01-10 Outpatient         ESTEFANI [facility]     [id number]

[address] [place]



        00:00:00 00:00:00                 NAS                 531     Method

i



                                                                        st

 

        2023-01-10 2023-01-10 Travel                  1.2.840.1 1.2.267.441 [id number]

[id number] [facility]



        00:00:00 00:00:00                         40832.1.1 350.1.13.43 684     

st



                                                3.430.2.7 0.2.7.3.698         Ho

spita



                                                .3.000370 084.8           l



                                                .8                      

 

        2022 2022-11-15 Outpatient                 IE    [facility]    [id number]

365 [facility]



        20:30:00 05:59:59                                 Internal 75      l



                                                        Medicine         [place]         

 

        2022 Outpatient         [facility]    [id number]

[id number] 



        14:30:00 23:59:59                 , Giuseppe                 75      



                                        Mar[place]                          

 

        2022 Outpatient                 IE    IE    [id number]

365 [facility]



        14:30:00 14:30:00                                         75      mena Larson

 

        2022 Between                 nullFlavo [facility]    [id number]

75 Memoria



        12:24:41 12:24:41 Visit                   r       Internal 53      l



                                                        Medicine         [place]         

 

        2022 Outpatient                 [facility]    [id number]

375 



        06:24:41 06:24:41                                         53      

 

        2022 Outpatient                 IE    IE    [id number]

365 [facility]



        12:15:00 12:15:00                                         76      l



                                                                        [place]

 

        2022 Outpatient                 nullFlavo [facility]    [id number]

[id number] Memoria



        18:15:00 04:59:59                         r       Internal 70      l



                                                        Medicine         [place]         

 

        2022 Outpatient         VISIT,  [facility]    [id number]

365 



        13:15:00 23:59:59                 NURSE STRB                 70      



                                        SLEEP                           

 

        2022 Outpatient                 IE    IE    [id number]

365 [facility]



        13:15:00 13:15:00                                         70      l



                                                                        Ne[place]

 

        2022-08-15 2022 Outpatient                 nullFlavo [facility]    [id number]

[id number] Memoria



        19:10:00 04:59:59                         r       Internal 73      l



                                                        Medicine         [place] Rojo         

 

        2022-08-15 2022-08-15 [facility]

[id number] 



        14:10:00 23:59:59                 , Giuseppe Vidal                          

 

        2022-08-15 2022-08-15 Outpatient                 [facility]    [facility]    [id number]

365 [facility]



        14:10:00 14:10:00                                         73      mena Larson

 

        2022 2022-08-10 Between                 nullFlavo [facility]    [id number]

75 Memoria



        22:05:40 22:05:40 Visit                   r       Internal 52      l



                                                        Medicine         [facility]         

 

        2022 2022-08-10 Outpatient                 [facility]    MG    [id number]

375 



        17:05:40 17:05:40                                         52      

 

        2022 2022-08-10 Between                 nullFlavo MG    [id number]

75 Memoria



        11:07:46 11:07:46 Visit                   r       Internal 51      l



                                                        Medicine         [place]



                                                        [place]         

 

        2022 2022-08-10 Outpatient                 MG    MG    [id number]

375 



        06:07:46 06:07:46                                         51      

 

        2022 Outpatient                 [facility]    [facility]    [id number]

365 [facility]



        14:00:00 14:00:00                                         74      mena



                                                                        [place]

 

        2022 Office          Estefani, 1.2.840.1 [id number]

6140 [facility]



        09:30:00 10:08:19 Visit           Nas 90494.1.1         160     st



                                        Giuseppe  3.430.2.7                 Hospit

a



                                                .3.503191                 l



                                                .8                      

 

        2022 Telephone         Destinee,   1.2.840.1 [id number]

[id number] [facility]



        00:00:00 00:00:00                 Leah  16368.1.1         103     st



                                                3.430.2.7                 Hospit

a



                                                .3.004047                 l



                                                .8                      

 

        2022 Travel                  1.2.840.1 1.2.447.422 3141

150423 [facility]



        00:00:00 00:00:00                         39740.1.1 350.1.13.43 437     

st



                                                3.430.2.7 0.2.7.3.698         Ho

spita



                                                .3.261896 084.8           l



                                                .8                      

 

        2022 Ambulatory                 nullFlavo [facility]    [id number]

[id number] Memoria



        15:00:00 15:00:00 Pre-Reg                 r       Urology 64      l



                                                        Shari Snowden

[facility]         

 

        2022 Outpatient                 IVANNAIE    IE    [id number]

365 [facility]



        10:00:00 10:00:00                                         64      mena



                                                                        [place]

 

        2022 Outpatient         Mal,  [facility]    [id number]

365 



        10:00:00 10:00:00                 Katina Stewart                 64      

 

        2022 2022-05-10 Outpatient                 nullFlavo [facility]    [id number]

[id number] Memoria



        19:50:00 04:59:59                         r       Internal 71      l



                                                        Medicine         [place]         

 

        2022 Outpatient         [facility]    851

[id number] 



        14:50:00 23:59:59                 , Giuseppe Vidal                          

 

        2022 Outpatient                 IE    IE    [id number]

365 [facility]



        14:50:00 14:50:00                                         71      mena Larson

 

        2022 Between                 nullFlavo [facility]    [id number]

75 Memoria



        13:02:49 13:02:49 Visit                   r       Internal 50      l



                                                        Medicine         [place]         

 

        2022 Outpatient                 [facility]    [id number]

375 



        08:02:49 08:02:49                                         50      

 

        2022 Outpatient                 IE    IE    [id number]

365 [facility]



        14:30:00 14:30:00                                         72      mena Larson

 

        2022 Outpatient                 IE    IE    [id number]

365 [facility]



        09:45:00 09:45:00                                         68      mena Larson

 

        2022 Outpatient                 nullFlavo [facility]    [id number]

[id number] Memoria



        20:30:00 05:59:59                         r       Internal 67      l



                                                        Medicine         [place]         

 

        2022 Outpatient         [facility]    851

[id number] 



        14:30:00 23:59:59                 , Giuseppe                 67      



                                        [place]                          

 

        [id number] Outpatient                 [facility]    [facility]    [id number]

365 [facility]



        14:30:00 14:30:00                                         67      mena Larson

 

        2022 Between                 nullFlavo [facility]    [id number]

75 [facility]oria



        13:38:24 13:38:24 Visit                   r       Internal 49      l



                                                        Medicine         [place]         

 

        2022 Outpatient                 [facility]    [facility]    [id number]

375 



        07:38:24 07:38:24                                         49      

 

        2022 Ambulatory                 nullFlavo [facility]    [id number]

12209 Memoria



        17:00:00 17:00:00 Pre-Reg                 r       Internal 68      l



                                                        Medicine         [place]         

 

        2022 Outpatient                 [facility]    [facility]    [id number]

365 



        11:00:00 11:00:00                                         68      

 

        2021-11-15 2021 Between                 nullFlavo [facility]    [id number]

75 [facility]



        23:26:00 23:26:00 Visit                   r       Internal 48      l



                                                        Medicine         [place]         

 

        2021-11-15 2021 Outpatient                 [facility]    [facility]    [id number]

375 



        17:26:00 17:26:00                                         48      

 

        2021 Phone                   nullFlavo [facility]    [id number]

55 Memoria



        23:31:40 04:59:59 Message                 r       Internal 16      l



                                                        Medicine         [place]         

 

        2021 Outpatient                 [facility]    [facility]    [id number]

355 



        18:31:40 23:59:59                                         16      

 

        2021 Outpatient                 nullFlavo [facility]    [id number]

[id number] Memoria



        18:45:00 04:59:59                         r       Internal 69      l



                                                        Medicine         [place]         

 

        2021 Outpatient         VISIT,  [facility]    [facility]    [id number]

365 



        13:45:00 23:59:59                 NURSE STRB                 69      



                                        SLEEP                           

 

        2021 Outpatient                 [facility]    [facility]    [id number]

365 [facility]



        13:45:00 13:45:00                                         69      l



                                                                        [place]

 

        2021 Outpatient                 nullFlavo [facility]    [id number]

[id number] Memoria



        20:10:00 04:59:59                         r       Internal 65      l



                                                        Medicine         [place]         

 

        [facility]21 [facility]         [facility]

[id number] 



        15:10:00 23:59:59                 , Giuseppe Vidal                          

 

        2021 Outpatient                 [facility]    [facility]    [id number]

365 [facility]



        15:10:00 15:10:00                                         65      mena Larson

 

        2021-10-27 2021-10-28 Between                 nullFlavo [facility]    [id number]

75 Memoria



        12:45:37 12:45:37 Visit                   r       Internal 46      l



                                                        Medicine         [place]



                                                        [place]         

 

        2021-10-27 2021-10-28 Outpatient                 [facility]    [facility]    [id number]

375 



        07:45:37 07:45:37                                         46      

 

        2021-10-25 2021-10-25 Ambulatory                 nullFlavo [facility]    [id number]

[id number] Memoria



        19:00:00 19:00:00 Pre-Reg                 r       Internal 66      l



                                                        Medicine         [place]         

 

        2021-10-25 2021-10-25 Outpatient                 MG    MG    [id number]

365 



        14:00:00 14:00:00                                         66      

 

        2021-10-11 2021-10-11 Outpatient                 [facility]    [facility]    [id number]

365 [facility]



        12:30:00 12:30:00                                         66      mena



                                                                        Neo

 

        2021 Phone                   nullFlavo [facility]    [id number]

55 Memoria



        19:56:06 04:59:59 Message                 r       Internal 15      l



                                                        Medicine         [place]         

 

        2021 Outpatient                 MG    [facility]    [id number]

355 



        14:56:06 23:59:59                                         15      

 

        2021 Outpatient                 nullFlavo [facility]    [id number]

[id number] Memoria



        19:30:00 04:59:59                         r       Internal 62      l



                                                        Medicine         [place]         

 

        [facility]21 [facility]         [facility]

[id number] 



        14:30:00 23:59:59                 , Giuseppe Vidal                          

 

        2021 Outpatient                 [facility]    [facility]    [id number]

365 [facility]



        14:30:00 14:30:00                                         62      mena Larson

 

        2021 2021-07-10 Between                 nullFlavo [facility]    [id number]

75 [facility]oria



        12:58:00 12:58:00 Visit                   r       Internal 44      l



                                                        Kadi Rojo         

 

        2021 2021-07-10 Outpatient                 MG    MG    [id number]

375 



        07:58:00 07:58:00                                         44      

 

        2021 Between                 nullFlavo MG    [id number]

75 Memoria



        13:05:59 13:05:59 Visit                   r       Internal 43      l



                                                        Kadi Rojo         

 

        [facility]21 [facility]                 [facility]    [id number]

375 



        08:05:59 08:05:59                                         43      

 

        2021 Outpatient                 MHIE    IE    [id number]

365 [facility]



        08:30:00 08:30:00                                         63      mena



                                                                        Neo

 

        2021-06-15 2021 Outpatient                 nullFlavo [facility]    [id number]

[id number] Memoria



        15:30:00 04:59:59                         r       Urology 54      l



                                                        Shari Snowden

[facility]         

 

        2021-06-15 2021-06-15 Outpatient         [facility]    [id number]

365 



        10:30:00 23:59:59                 Nas                 54      



                                        Giuseppe                          

 

        2021-06-15 2021-06-15 Outpatient                 IE    IE    [id number]

365 [facility]



        10:30:00 10:30:00                                         54      mena



                                                                        [place]

 

        2021 Phone                   nullFlavo MG    [id number]

55 [facility]oria



        13:56:17 04:59:59 Message                 r       Urology 14      l



                                                        Shari Snowden

[facility]         

 

        2021 [facility]                 [facility]    [id number]

355 



        08:56:17 23:59:59                                         14      

 

        2021 Phone                   nullFlavo MG    [id number]

55 Memoria



        19:23:50 04:59:59 Message                 r       Internal 13      l



                                                        Kadi Rojo         

 

        2021 [facility]                 [facility]    [id number]

355 



        14:23:50 23:59:59                                         13      

 

        2021 Outpatient                 nullFlavo MG    [id number]

[id number] Memoria



        20:00:00 04:59:59                         r       Internal 60      l



                                                        Kadi Rojo         

 

        [facility]21 [facility]         [facility]

[id number] 



        15:00:00 23:59:59                 , Giuseppe                 60      



                                        [place]                          

 

        2021 Outpatient                 [facility]    [facility]    [id number]

365 [facility]



        15:00:00 15:00:00                                         60      mena



                                                                        Ne[place]

 

        2021 Between                 nullFlavo [facility]    [id number]

75 [facility]oria



        11:26:44 11:26:44 Visit                   r       Internal 40      l



                                                        Medicine         [place] Rojo         

 

        2021 Outpatient                 [facility]    [facility]    [id number]

375 



        06:26:44 06:26:44                                         40      

 

        2021 Outpatient                 [facility]    [facility]    [id number]

365 [facility]



        08:00:00 08:00:00                                         61      mena Larson

 

        2021 Outpatient                 nullFlavo [facility]    [id number]

[id number] Memoria



        21:00:00 05:59:59                         r       Internal 58      l



                                                        Medicine         [place] Rojo         

 

        2021 [facility]         [facility]

[id number] 



        15:00:00 23:59:59                 , Giuseppe                 58      



                                        [place]                          

 

        2021 Outpatient                 [facility]    [facility]    [id number]

365 [facility]



        15:00:00 15:00:00                                         58      mena Larson

 

        2020 Between                 nullFlavo MG    [id number]

75 [facility]oria



        14:36:50 14:36:50 Visit                   r       Internal 39      l



                                                        Medicine         [place] Rojo         

 

        2020 Outpatient                 [facility]    MG    [id number]

375 



        08:36:50 08:36:50                                         39      

 

        2020 Outpatient                 [facility]    [facility]    [id number]

365 [facility]



        10:30:00 10:30:00                                         59      mena Larson

 

        2020 Phone                   nullFlavo [facility]    [id number]

55 Memoria



        15:13:37 05:59:59 Message                 r       Internal 12      l



                                                        Medicine         Neo Rojo         

 

        2020 Outpatient                 [facility]    [facility]    [id number]

355 



        09:13:37 23:59:59                                         12      

 

        2020-10-05 2020-10-06 Outpatient                 nullFlavo [facility]    [id number]

[id number] Memoria



        20:00:00 04:59:59                         r       Internal 55      l



                                                        Medicine         Neo



                                                        [place]         

 

        2020-10-05 2020-10-05 [facility]         [facility]

[id number] 



        15:00:00 23:59:59                 , Giuseppe                 55      



                                        [place]                          

 

        2020-10-05 2020-10-05 Outpatient                 [facility]    [facility]    [id number]

365 [facility]



        15:00:00 15:00:00                                         55      l



                                                                        [place]

 

        2020 Between                 nullFlavo MG    [id number]

75 Memoria



        13:31:57 13:31:57 Visit                   r       Internal 37      l



                                                        Medicine         [facility]         

 

        2020 Outpatient                 MG    MG    [id number]

375 



        08:31:57 08:31:57                                         37      

 

        2020 Outpatient                 MHIE    IE    [id number]

365 [facility]



        07:00:00 07:00:00                                         56      mena



                                                                        [place]

 

        2020 Phone                   nullFlavo MG    [id number]

55 Memoria



        15:44:32 04:59:59 Message                 r       Internal 11      l



                                                        Medicine         Neo



                                                        [place]         

 

        2020 2020-09-10 Outpatient                 MG    MG    [id number]

355 



        10:44:32 23:59:59                                         11      

 

        2020 Outpatient                 nullFlavo MG Sleep 85

[id number] Memoria



        20:15:00 04:59:59                         r       Medicine 57      l



                                                        [place]         [place]

 

        2020 Outpatient         [facility]    [id number]

365 



        15:15:00 23:59:59                 NURSE ST                 57      



                                        SLEEP                           

 

        2020 Outpatient                 [facility]    IE    [id number]

365 [facility]



        15:15:00 15:15:00                                         57      l



                                                                        [place]

 

        2020 Outpatient                 nullFlavo MG    [id number]

[id number] [facility]oria



        19:00:00 04:59:59                         r       Internal 51      l



                                                        Medicine         [facility]         

 

        2020 Outpatient         Ninoska Alexis MG    MG    [id number]

[id number] 



        14:00:00 23:59:59                                         51      

 

        2020 Outpatient                 MHIE    IE    [id number]

365 [facility]



        14:00:00 14:00:00                                         51      mena



                                                                        [place]

 

        2020 Outpatient                 MHIE    MHIE    [id number]

365 [facility]



        07:30:00 07:30:00                                         52      l



                                                                        [place]

 

        2020-06-15 2020 Outpatient                 nullFlavo MG    [id number]

[id number] Memoria



        16:15:00 04:59:59                         r       Urology 53      l



                                                        [place]         Isi

[facility]         

 

        2020-06-15 2020-06-15 Outpatient         [facility]

[id number] 



        11:15:00 23:59:59                                         53      

 

        2020-06-15 2020-06-15 Outpatient                 MHIE    MHIE    [id number]

365 [facility]



        11:15:00 11:15:00                                         53      l



                                                                        [place]

 

        2020 Phone                   nullFlavo MG    [id number]

55 Memoria



        14:55:10 04:59:59 Message                 r       Urology 10      l



                                                        [place]         Isi

[facility]         

 

        2020 2020-06-10 Outpatient                 MG    MG    [id number]

355 



        09:55:10 23:59:59                                         10      

 

        2020 Ambulatory                 nullFlavo MG    [id number]

85442 Memoria



        16:15:00 16:15:00 Pre-Reg                 r       Urology 44      l



                                                        [place]         Isi

[facility]         

 

        2020 Outpatient                 MHIE    MHIE    [id number]

365 [facility]



        11:15:00 11:15:00                                         44      mena



                                                                        [place]

 

        2020 Outpatient         Alexis Xiao [facility]    [id number]

[id number] 



        11:15:00 11:15:00                                         44      

 

        2020 Between                 nullFlavo MG    [id number]

75 Memoria



        14:12:28 14:12:28 Visit                   r       Internal 34      l



                                                        Medicine         [place]



                                                        [place]         

 

        2020 Outpatient                 MG    MG    [id number]

375 



        08:12:28 08:12:28                                         34      

 

        2020 Outpatient                 nullFlavo MG    [id number]

21081 Memoria



        20:00:00 05:59:59                         r       Internal 49      l



                                                        Medicine         [facility]         

 

        2020 [facility]

[id number] 



        14:00:00 23:59:59                 , Giuseppe Vidal                          

 

        2020 Outpatient                 MHIE    MHIE    [id number]

365 [facility]



        14:00:00 14:00:00                                         49      mena



                                                                        [place]

 

        2020 Outpatient                 MHIE    MHIE    [id number]

365 [facility]



        07:00:00 07:00:00                                         50      mena



                                                                        [place]

 

        2019 Between                 nullFlavo MHMG    [id number]

75 Memoria



        13:58:47 13:58:47 Visit                   r       Internal 32      l



                                                        Medicine         [place]



                                                        [place]         

 

        2019 Outpatient                 MHMG    MHMG    [id number]

375 



        07:58:47 07:58:47                                         32      

 

        2019 Outpatient                 nullFlavo MHMG    [id number]

[id number] Memoria



        20:00:00 05:59:59                         r       Internal 47      l



                                                        Medicine         [place]



                                                        [place]         

 

        2019 [facility]         [id number]

[id number] 



        14:00:00 23:59:59                 , Giuseppe Vidal                          

 

        2019 Ambulatory                 nullFlavo MG    [id number]

[id number] Memoria



        16:45:00 16:45:00 Pre-Reg                 r       Internal 48      l



                                                        Medicine         [place]



                                                        [place]         

 

        2019 Outpatient                 MHIE    MHIE    [id number]

365 [facility]



        14:00:00 14:00:00                                         47      mena



                                                                        [place]

 

        2019 Outpatient                 MHIE    MHIE    [id number]

365 [facility]



        10:45:00 10:45:00                                         48      mena



                                                                        [place]

 

        2019 Outpatient                 MG    MHMG    [id number]

365 



        10:45:00 10:45:00                                         48      

 

        2019 Outpatient                 nullFlavo MHMG    [id number]

15389 Memoria



        18:30:00 04:59:59                         r       Internal 41      l



                                                        Medicine         [place]



                                                        [place]         

 

        2019 [facility]         [id number]

[id number] 



        13:30:00 23:59:59                 , Giuseppe Vidal                          

 

        2019 Outpatient                 MHIE    MHIE    [id number]

365 [facility]



        13:30:00 13:30:00                                         41      mena Larson

 

        2019 Between                 nullFlavo MHMG    [id number]

75 Memoria



        14:25:14 14:25:14 Visit                   r       Internal 30      l



                                                        Medicine         [place]         

 

        2019 Outpatient                 MG    MG    [id number]

375 



        09:25:14 09:25:14                                         30      

 

        2019 Outpatient                 MHIE    MHIE    [id number]

365 [facility]



        08:25:00 08:25:00                                         42      mena Larson

 

        2019 Between                 nullFlavo [facility] 8511

[id number] Memoria



        12:03:30 12:03:30 Visit                   r       Medicine 28      l



                                                        Darrel Shah

vivi

 

        2019 [facility]                 [facility]    [id number]

375 



        07:03:30 07:03:30                                         28      

 

        2019 Outpatient                 nullFlavo [facility]    [id number]

[id number] Memoria



        19:15:00 04:59:59                         r       Radiology 46      mena Shah

vivi

 

        2019 Outpatient                 nullFlavo [facility] 8

[id number] Memoria



        16:30:00 04:59:59                         r       Medicine 45      mena Shah

vivi

 

        2019 Outpatient         VISIT,  [facility]    [id number]

365 



        14:15:00 23:59:59                 NURSE STRB                 46      



                                        KAILEY                            

 

        2019 Outpatient         Steve, [facility]    [id number]

[id number] 



        11:30:00 23:59:59                 Katya                   45      

 

        2019 Outpatient                 IE    IE    [id number]

365 [facility]



        14:15:00 14:15:00                                         46      mena



                                                                        [place]

 

        2019 Outpatient                 MHIE    IE    [id number]

365 [facility]



        11:30:00 11:30:00                                         45      mena Larson

 

        2019 Outpatient                 nullFlavo [facility] 85

[id number] Memoria



        18:15:00 04:59:59                         r       Medicine 36      mena Larson

 

        2019 Outpatient         [facility]    851

[id number] 



        13:15:00 23:59:59                 Giuseppe                          

 

        2019 Outpatient                 MHIE    IE    [id number]

365 [facility]



        13:15:00 13:15:00                                         36      mena Larson

 

        2019 Outpatient                 nullFlavo MG    [id number]

[id number] Memoria



        16:15:00 04:59:59                         r       Urology 43      l



                                                        [place]

[facility]         

 

        2019 Outpatient         [facility]

[id number] 



        11:15:00 23:59:59                                         43      

 

        2019 Outpatient                 MHIE    MHIE    [id number]

365 [facility]



        11:15:00 11:15:00                                         43      mena Larson

 

        2019 Between                 nullFlavo MG    [id number]

75 Memoria



        14:12:26 14:12:26 Visit                   r       Internal 26      l



                                                        Medicine         [place]         

 

        2019 Out[facility]                 [facility]    [id number]

375 



        08:12:26 08:12:26                                         2019 Outpatient                 nullFlavo MG    [id number]

[id number] Memoria



        19:30:00 05:59:59                         r       Internal 39      l



                                                        Medicine         [place]



                                                        [place]         

 

        2019 Outpatient         [facility]    851

[id number] 



        13:30:00 23:59:59                 , Giuseppe                 Nicole      



                                        [place]                          

 

        2019 Outpatient                 MHIE    IE    [id number]

365 [facility]



        13:30:00 13:30:00                                         39      mena Larson

 

        2019 Between                 nullFlavo MG    [id number]

75 Memoria



        14:40:19 14:40:19 Visit                   r       Internal 24      l



                                                        Medicine         [place]         

 

        2019 Out[facility]                 [facility]    [id number]

375 



        08:40:19 08:40:19                                         2019 Outpatient                 MHIE    MHIE    [id number]

365 [facility]



        08:15:00 08:15:00                                         40      mena Larson

 

        2018 Phone                   nullFlavo [facility]    [id number]

55 Memoria



        22:55:00 05:59:59 Message                 r       Internal 09      l



                                                        Medicine         [place]         

 

        2018 Outpatient                 [facility]    [id number]

355 



        16:55:00 23:59:59                                         09      

 

        2018-10-23 2018 Ambulatory                 nullFlavo MG    [id number]

[id number] Memoria



        15:45:00 15:45:00 Pre-Reg                 r       Internal 38      l



                                                        Medicine         [place]



                                                        [place]         

 

        2018-10-23 2018 Outpatient                 MG    MHMG    [id number]

365 



        10:45:00 09:45:00                                         38      

 

        2018-10-29 2018-10-30 Outpatient                 nullFlavo MG    [id number]

[id number] Memoria



        18:30:00 04:59:59                         r       Internal 37      l



                                                        Medicine         [place]



                                                        [place]         

 

        2018-10-29 2018-10-29 [facility]         [facility]

[id number] 



        13:30:00 23:59:59                 , Giuseppe Álvarez      



                                        [place]                          

 

        2018-10-29 2018-10-29 Outpatient                 MHIE    MHIE    [id number]

365 [facility]



        13:30:00 13:30:00                                         37      mena



                                                                        [place]

 

        2018-10-23 2018-10-23 Outpatient                 MHIE    MHIE    [id number]

365 [facility]



        10:45:00 10:45:00                                         38      mena Larson

 

        2018 Phone                   nullFlavo MG    [id number]

55 Memoria



        20:51:00 04:59:59 Message                 r       Internal 08      l



                                                        Medicine         [place]



                                                        [place]         

 

        2018 Outpatient                 MG    MHMG    [id number]

355 



        15:51:00 23:59:59                                         08      

 

        2018 Outpatient                 nullFlavo MG    [id number]

[id number] Memoria



        14:30:00 04:59:59                         r       Internal 34      l



                                                        Medicine         [place]



                                                        [place]         

 

        2018 Outpatient         [facility]

[id number] 



        09:30:00 23:59:59                 , Giuseppe Beckman      



                                        [place]                          

 

        2018 Outpatient                 MHIE    MHIE    [id number]

365 [facility]



        09:30:00 09:30:00                                         34      mena Larson

 

        2018 Outpatient                 MHIE    MHIE    [id number]

365 [facility]



        08:00:00 08:00:00                                         35      mena Larson

 

        2018-06-15 2018 Phone                   nullFlavo MHMG    [id number]

55 Memoria



        14:56:00 04:59:59 Message                 r       Internal 07      l



                                                        Medicine         [place]



                                                        [place]         

 

        2018-06-15 2018 Outpatient                 MHMG    MHMG    [id number]

355 



        09:56:00 23:59:59                                         07      

 

        2018 2018-06-15 Outpatient                 nullFlavo MHMG    [id number]

[id number] Memoria



        19:00:00 04:59:59                         r       Internal 25      mena [place]         

 

        2018 Outpatient         VISIT,  MG    MG    [id number]

365 



        14:00:00 23:59:59                 NURSE [facility]                 25      



                                        CLEMENT                           

 

        2018 Outpatient                 MHIE    MHIE    [id number]

365 [facility]



        14:00:00 14:00:00                                         25      mena Larson

 

        2018 Outpatient                 nullFlavo MG    [id number]

[id number] Memoria



        21:30:00 05:59:59                         r       Internal 28      mena Rojo         

 

        2018 Out[facility]         [facility]

[id number] 



        15:30:00 23:59:59                 , Giuseppe Vidal                          

 

        2018 Outpatient         [facility]

[id number] 



        15:30:00 23:59:59                 , Giuseppe Sorto      



                                        [place]                          

 

        2018 Outpatient                 MHIE    MHIE    [id number]

365 [facility]



        15:30:00 15:30:00                                         28      mena Larson

 

        2018 Outpatient                 nullFlavo MG    [id number]

[id number] Memoria



        17:00:00 05:59:59                         r       Urology 31      l



                                                        [place]         [facility]         

 

        2018 Outpatient         Alexis Xiao     MG    [id number]

[id number] 



        11:00:00 23:59:59                                         31      

 

        2018 Outpatient         Alexis Xiao     [facility]    851

[id number] 



        11:00:00 23:59:59                                         31      

 

        2018 Outpatient                 MHIE    MHIE    [id number]

365 [facility]



        11:00:00 11:00:00                                         31      mena Larson

 

        2017 Phone                   nullFlavo MHMG    [id number]

55 Memoria



        15:48:00 05:59:59 Message                 r       Urology 05      l



                                                        [place]         Isi

[facility]         

 

        2017 Outpatient                 MG    MHMG    [id number]

355 



        09:48:00 23:59:59                                         05      

 

        2017 Outpatient                 nullFlavo MHMG    [id number]

[id number] Memoria



        16:15:00 05:59:59                         r       Radiology 33      mena Shah

vivi

 

        2017 Outpatient                 nullFlavo [facility]    [id number]

[id number] Memoria



        15:30:00 05:59:59                         r       Internal 32      l



                                                        Kadi [place]         

 

        2017 Outpatient         VISIT,  MG    MG    [id number]

365 



        10:15:00 23:59:59                 NURSE STRB                 33      



                                        KAILEY                            

 

        2017 [facility]         [id number]

[id number] 



        09:30:00 23:59:59                 , Giuseppe Vidal                          

 

        2017 Outpatient                 MHIE    MHIE    [id number]

365 [facility]



        10:15:00 10:15:00                                         33      mena Larson

 

        2017 Outpatient                 MHIE    MHIE    [id number]

365 [facility]



        09:30:00 09:30:00                                         32      mena Larson

 

        2017 Outpatient                 MHIE    MHIE    [id number]

365 [facility]



        10:45:00 10:45:00                                         30      mena Larson

 

        2017-10-23 2017-10-23 Outpatient                 MHIE    MHIE    [id number]

365 [facility]



        15:30:00 15:30:00                                         26      mena Larson

 

        2017 Outpatient                 MHIE    MHIE    [id number]

365 [facility]



        15:30:00 15:30:00                                         15      mena Larson

 

        2017-06-15 2017-06-15 Outpatient                 MHIE    MHIE    [id number]

365 [facility]



        13:15:00 13:15:00                                         06      mena Larson

 

        2017 Outpatient                 MHIE    MHIE    [id number]

365 [facility]



        10:00:00 10:00:00                                         23      mena Larson

 

        2017 Outpatient                 MHIE    MHIE    [id number]

365 [facility]



        16:45:00 16:45:00                                         24      mena Larson

 

        2017 Outpatient                 MHIE    MHIE    [id number]

365 [facility]



        11:00:00 11:00:00                                         22      mena Larson

 

        2017 2017 Day                     [facility]Flavo [facility] [id number]

375 [facility]



        15:02:30 21:50:00 Surgery                 r       [place] 11      l



                                                        [place]         Isi

nn

 

        2017 Outpatient         Pito,   [facility]    [facility]    [id number]

375 



        10:02:30 16:50:00                 Clyde F                 11      

 

        2017 Outpatient                 MHIE    MHIE    [id number]

365 [facility]



        16:45:00 16:45:00                                         21      mena Larson

 

        2017 Outpt Diag                 nullFlavo [facility]    [id number]

[id number] Memoria



        17:48:00 04:59:00 Services                 r       Outpatient 02      l



                                                        Imaging         [place]



                                                        [place]         

 

        2017 Outpatient         Pito,   MH29    [id number]    [id number]

385 



        12:48:00 23:59:00                 Clyde GÓMEZ                 02      

 

        2017 Outpatient                 MHIE    MHIE    [id number]

365 [facility]



        10:30:00 10:30:00                                         17      mena Larson

 

        2017 Outpatient                 MHIE    MHIE    [id number]

365 [facility]



        16:00:00 16:00:00                                         18      mena Larson

 

        2017 Outpatient                 MHIE    MHIE    [id number]

365 [facility]



        16:00:00 16:00:00                                         20      mena Larson

 

        2017 Outpatient                 MHIE    MHIE    [id number]

365 [facility]



        16:00:00 16:00:00                                         19      mena Larson

 

        2017 Outpatient                 MHIE    MHIE    [id number]

365 [facility]



        15:30:00 15:30:00                                         10      mena Larson

 

        2017 Outpatient                 MHIE    MHIE    [id number]

365 [facility]



        15:30:00 15:30:00                                         14      mena Larson

 

        2016-11-10 2016-11-10 Outpatient                 MHIE    MHIE    [id number]

365 [facility]



        13:45:00 13:45:00                                         12      mena Larson

 

        2016-10-17 2016-10-17 Outpatient                 MHIE    MHIE    [id number]

365 [facility]



        16:30:00 16:30:00                                         09      mena Larson

 

        2016-10-17 2016-10-17 Outpatient                 MHIE    MHIE    [id number]

365 [facility]



        15:30:00 15:30:00                                         07      mena Larson

 

        2016 [id number]



        15:30:00 15:30:00                                         04      l



                                                                        [place]

 

        2016 Outpatient                 RASHID    IE    [id number]

365 [facility]



        13:15:00 13:15:00                                         00      mena



                                                                        [place]

 

        2016 Outpatient                 RASHID    RASHID    [id number]

365 [facility]



        15:30:00 15:30:00                                         02      mena Larson

 

        2015-10-26 2015-10-26 Outpatient                 RASHID    RASHID    [id number]

365 [facility]



        15:30:00 15:30:00                                         01      mena Larson

 

        2013 [facility]                      [id number]

 [facility]



        12:16:00 12:16:00                                         00      mena Larson







Results







           Test Description Test Time  Test Comments Results    Result Comments 

Source









                    POC BLADDER SCAN/PVR 2023-01-10 15:45:07 









                      Test Item  Value      Reference Range Interpretation Comme

nts









             Volume (test code = 7307203) 0                                     

 



[facility] urinalysis dipstick2023-01-10 15:39:01





             Test Item    Value        Reference Range Interpretation Comments

 

             Color urine, POC (test Yellow                                 



             code = 5869182)                                        

 

             Clarity urine, POC (test Clear                                  



             code = 6675378)                                        

 

             Glucose urine, POC (test Negative     Negative                  



             code = 5061912)                                        

 

             Bilirubin urine, POC Negative     Negative                  



             (test code = 6059180)                                        

 

             Ketones urine, POC (test Negative     Negative                  



             code = 7729344)                                        

 

             Specific gravity urine, 1.020        1.005-1.030               



             POC (test code =                                        



             7807439)                                            

 

             Blood urine, POC (test Negative     Negative                  



             code = 7413165)                                        

 

             pH urine, POC (test code 6.0          See_Comment                [A

utomated message]



             = 1604123)                                          The system [employer]



                                                                 generated this 

result



                                                                 transmitted ref

erence



                                                                 range: 5.0, 5.5

, 6.0,



                                                                 6.5, 7.0, 7.5, 

8.0,



                                                                 8.5. The refere

nce



                                                                 range was not u

sed to



                                                                 interpret this 

result



                                                                 as normal/abnor

mal.

 

             Protein urine, POC (test 3+           Negative     A            



             code = 1838969)                                        

 

             Urobilinogen urine, POC <2.0         <=2.0                     



             (test code = 6534158)                                        

 

             Nitrite urine, POC (test Negative     Negative                  



             code = 8070699)                                        

 

             Leukocyte esterase Negative     Negative                  



             urine, POC (test code =                                        



             7329393)                                            

 

             Lab Interpretation (test Abnormal                               



             code = 02191-5)                                        



[id number] 09[facility]26:00





             Test Item    Value        Reference Range Interpretation Comments

 

             PSA, total   0.49 ng/mL   See_Comment               NOTE: NCCN



             (test code =                                        Guidelines(2.20

21)recommend



             4197)                                               repeat testing 

every 2-4 years



                                                                 if PSA is <1 ng

/mL and every



                                                                 1-2 years if PS

A is 1-3 ng/mL



                                                                 in men aged 45 

to 75 years. A



                                                                 PSA value of 1.

00 ng/mL selects



                                                                 for the upper r

ray of PSA



                                                                 values. Men who

 have a PSA



                                                                 above the media

n for their age



                                                                 group are at a 

higher risk for



                                                                 prostate cancer

 and for the



                                                                 aggressive form

 of the disease.



                                                                 The higher abov

e the median,



                                                                 the greater the

 risk. NOTE: The



                                                                 PSA assay shoul

d not be the



                                                                 only test used 

for diagnostic



                                                                 purposes. Addit

ional evaluation



                                                                 using JAXSON, ultr

asound, TUR or



                                                                 similar procedu

res may be used



                                                                 for this purpos

e. Predictions



                                                                 of disease recu

rrence should



                                                                 not be based so

delilah upon values



                                                                 obtained from s

erial PSA values



                                                                 obtained on the

 patient.NOTE:



                                                                 Values obtained

 with different



                                                                 assay methods o

r kits cannot be



                                                                 used interchang

eably.NOTE:



                                                                 Results cannot 

be interpreted



                                                                 as absolute nicolas

dence of the



                                                                 presence or abs

ence of



                                                                 malignant disea

se. ASSAY



                                                                 INFORMATION: Me

thod



                                                                 Electrochemilum

inescence



                                                                 Immunoassay  (R

oche



                                                                 Diagnostics) NO

TE: This assay



                                                                 has no biotin i

nterference in



                                                                 serum concentra

tions up to 1200



                                                                 ng/mL. [employer]

kinetic studies



                                                                 have shown that

 serum



                                                                 concentrations 

of biotin can



                                                                 reach up to 355

 ng/mL within



                                                                 the first hour 

after biotin



                                                                 ingestion for s

ubjects



                                                                 consuming suppl

ements of 20 mg



                                                                 biotin per day 

and up to 1160



                                                                 ng/mL for subje

cts after a



                                                                 single dose of 

300 mg biotin.



                                                                 [Automated mess

age] The system



                                                                 which generated

 this result



                                                                 transmitted ref

erence range:



                                                                 <=4.00. The ref

erence range was



                                                                 not used to int

erpret this



                                                                 result as arabella

l/abnormal.



[facility] urinalysis [id number] 14:54:00





             Test Item    Value        Reference Range Interpretation Comments

 

             Color urine, POC (test Yellow                                 



             code = 5095930)                                        

 

             Clarity urine, POC Clear                                  



             (test code = 3485766)                                        

 

             Glucose urine, POC Negative     Negative                  



             (test code = 3738634)                                        

 

             Bilirubin urine, POC Negative     Negative                  



             (test code = 6391078)                                        

 

             Ketones urine, POC Negative     Negative                  



             (test code = 5997640)                                        

 

             Specific gravity urine,              1.005-1.030               



             POC (test code =                                        



             3365735)                                            

 

             Blood urine, POC (test Negative     Negative                  



             code = 7063807)                                        

 

             pH urine, POC (test              See_Comment                [Automa

suma message]



             code = 7783806)                                        The system w

[employer]



                                                                 generated this 

result



                                                                 transmitted ref

erence



                                                                 range: 5.0, 5.5

, 6.0,



                                                                 6.5, 7.0, 7.5, 

8.0,



                                                                 8.5. The refere

nce



                                                                 range was not u

sed to



                                                                 interpret this 

result



                                                                 as normal/abnor

mal.

 

             Protein urine, POC Negative     Negative                  



             (test code = 8379602)                                        

 

             Urobilinogen urine, POC <2.0         See_Comment                [Au

tomated message]



             (test code = 3859629)                                        The sy

stem which



                                                                 generated this 

result



                                                                 transmitted ref

erence



                                                                 range: <=2.0. T

he



                                                                 reference range

 was



                                                                 not used to int

erpret



                                                                 this result as



                                                                 normal/abnormal

.

 

             Nitrite urine, POC Negative     Negative                  



             (test code = 8255647)                                        

 

             Leukocyte esterase Negative     Negative                  



             urine, POC (test code =                                        



             0745152)                                            



[facility] BLADDER SCAN/ZZC7469-05-96 14:53:00





             Test Item    Value        Reference Range Interpretation Comments

 

             Urine volume (test code = 6354)                                    

    



[facility]2021-06-15 15:22:00





             Test Item    Value        Reference Range Interpretation Comments

 

             POC UA Color (test Yellow *NA*(6/15/21                           



             code = POC UA Color) 10:22 AM)                              



[facility]2021-06-15 15:22:00





             Test Item    Value        Reference Range Interpretation Comments

 

             POC UA Turbidity (test Clear *NA*(6/15/21                          

 



             code = POC UA Turbidity) 10:22 AM)                              



[facility]2021-06-15 15:22:00





             Test Item    Value        Reference Range Interpretation Comments

 

             POC UA SG (test code = POC UA SG) 1.020 1                          

      



[facility]2021-06-15 15:22:00





             Test Item    Value        Reference Range Interpretation Comments

 

             POC UA pH (test code = POC UA pH) 6.0 1        5.0-8.0             

      



[facility]00





             Test Item    Value        Reference Range Interpretation Comments

 

             POC UA Prot (test code = POC UA Trace mg/dL                        

    



             Prot)                                               



[facility] 15:22:00





             Test Item    Value        Reference Range Interpretation Comments

 

             POC UA Glu (test code = POC UA Negative mg/dL                      

     



             Glu)                                                



[facility] 15:22:00





             Test Item    Value        Reference Range Interpretation Comments

 

             POC UA Ket (test code = POC UA Negative mg/dL                      

     



             Ket)                                                



[facility] AND ST[facility]2021-06-15 15:22:00





             Test Item    Value        Reference Range Interpretation Comments

 

             POC UA Bili (test Negative *NA*(6/15/21                           



             code = POC UA Bili) 10:22 AM)                              



[facility] AND STOOL2021-06-15 15:22:00





             Test Item    Value        Reference Range Interpretation Comments

 

             POC UA Bld (test code Negative *NA*(6/15/21                        

   



             = POC UA Bld) 10:22 AM)                              



[facility] AND STOOL2021-06-15 15:22:00





             Test Item    Value        Reference Range Interpretation Comments

 

             POC UA Uro (test code = POC UA Uro) 0.2          0.1-1.0           

        



[facility] AND STOOL2021-06-15 15:22:00





             Test Item    Value        Reference Range Interpretation Comments

 

             POC UA Nit (test code Negative *NA*(6/15/21                        

   



             = POC UA Nit) 10:22 AM)                              



[facility] AND STOOL2021-06-15 15:22:00





             Test Item    Value        Reference Range Interpretation Comments

 

             POC UA LeukEst (test Negative *NA*(6/15/21                         

  



             code = POC UA LeukEst) 10:22 AM)                              



[id number]00





             Test Item    Value        Reference Range Interpretation Comments

 

             PSA (test code = PSA) 0.4                                    



[facility]irCHE[facility] [id number] 18:40:00





             Test Item    Value        Reference Range Interpretation Comments

 

             eGFR (test code = eGFR) 68                                     



[id number]40:00





             Test Item    Value        Reference Range Interpretation Comments

 

             Calcium Lvl (test code = Calcium Lvl) 8.7          8.5-10.5        

          



[facility] [id number] 18:40:00





             Test Item    Value        Reference Range Interpretation Comments

 

             Chloride Lvl (test code = Chloride Lvl) 106                  

            



[facility]2017-04-21 18:40:00





             Test Item    Value        Reference Range Interpretation Comments

 

             CO2 (test code = CO2) 29           24-32                     



[facility]2017-04-21 18:40:00





             Test Item    Value        Reference Range Interpretation Comments

 

             Sodium Lvl (test code = Sodium Lvl) 142          135-145           

        



[facility]2017-04-21 18:40:00





             Test Item    Value        Reference Range Interpretation Comments

 

             Potassium Lvl (test code = Potassium 4.3          3.5-5.1          

         



             Lvl)                                                



[facility]2017-04-21 18:40:00





             Test Item    Value        Reference Range Interpretation Comments

 

             Creatinine Lvl (test code = Creatinine 1.15         0.50-1.40      

           



             Lvl)                                                



[facility]2017-04-21 18:40:00





             Test Item    Value        Reference Range Interpretation Comments

 

             BUN (test code = BUN) 14           7-22                      



[facility]2017-04-21 18:40:00





             Test Item    Value        Reference Range Interpretation Comments

 

             Glucose Lvl (test code = Glucose Lvl) 193          70-99           

          



[facility]2017-04-21 18:40:00





             Test Item    Value        Reference Range Interpretation Comments

 

             AGAP (test code = AGAP) 11.3         10.0-20.0                 



[id number]40:00





             Test Item    Value        Reference Range Interpretation Comments

 

             Macrocyte (test code = 1+ *ABN*(17                           



             Macrocyte)   1:40 PM)                               



[facility] RockPgntweiIAUIQEMKDR9826-99-51 18:40:00





             Test Item    Value        Reference Range Interpretation Comments

 

             Anisocyte (test code = 1+ *ABN*(17                           



             Anisocyte)   1:40 PM)                               



[facility] RockDqdagqmVMLHQFFLTF3277-87-24 18:40:00





             Test Item    Value        Reference Range Interpretation Comments

 

             Basophils # (test code 0.0          See_Comment                [Aut

omated message] The



             = Basophils #)                                        system which 

generated



                                                                 this result tra

nsmitted



                                                                 reference range

: <=0.2.



                                                                 The reference r

ray was



                                                                 not used to int

erpret



                                                                 this result as



                                                                 normal/abnormal

.



[facility] RockQwwjdnwNIJXFMKXOH6139-90-59 18:40:00





             Test Item    Value        Reference Range Interpretation Comments

 

             Eosinophils # (test code 0.7          See_Comment                [A

utomated message] The



             = Eosinophils #)                                        system whic

h generated



                                                                 this result tra

nsmitted



                                                                 reference range

: <=0.5.



                                                                 The reference r

ray was



                                                                 not used to int

erpret



                                                                 this result as



                                                                 normal/abnormal

.



[facility] RockIvrhrrpKSXDYBDFGP2082-12-26 18:40:00





             Test Item    Value        Reference Range Interpretation Comments

 

             Monocytes (test code = Monocytes) 9.1          2.0-12.0            

      



[facility] RockKnvxortXIGKSGUBEB6691-53-74 18:40:00





             Test Item    Value        Reference Range Interpretation Comments

 

             Basophils (test code = 0.2          See_Comment                [Aut

omated message] The



             Basophils)                                          system which ge

nerated



                                                                 this result tra

nsmitted



                                                                 reference range

: <=1.0.



                                                                 The reference r

ray was



                                                                 not used to int

erpret



                                                                 this result as



                                                                 normal/abnormal

.



[facility] RockXfxzkfmITWMPODHRW9317-50-30 18:40:00





             Test Item    Value        Reference Range Interpretation Comments

 

             Eosinophils (test code = 6.3          See_Comment                [A

utomated message] The



             Eosinophils)                                        system which ge

nerated



                                                                 this result tra

nsmitted



                                                                 reference range

: <=4.0.



                                                                 The reference r

ray was



                                                                 not used to int

erpret



                                                                 this result as



                                                                 normal/abnormal

.



[facility] RockHcdyzgvJXQHXDPBWC1246-64-75 18:40:00





             Test Item    Value        Reference Range Interpretation Comments

 

             RBC Morph (test code = Normal (17 1:40                        

   



             RBC Morph)   PM)                                    



[facility] RockIniirfhLVDSLGHGIW1905-60-31 18:40:00





             Test Item    Value        Reference Range Interpretation Comments

 

             Segs-Bands # (test code = Segs-Bands #) 4.6          1.5-8.1       

            



[facility] RockUsaomdgYJSSBVPWXP6703-01-90 18:40:00





             Test Item    Value        Reference Range Interpretation Comments

 

             Monocytes # (test code 1.0          See_Comment                [Aut

omated message] The



             = Monocytes #)                                        system which 

generated



                                                                 this result tra

nsmitted



                                                                 reference range

: <=0.8.



                                                                 The reference r

ray was



                                                                 not used to int

erpret



                                                                 this result as



                                                                 normal/abnormal

.



[facility] [id number] 18:40:00





             Test Item    Value        Reference Range Interpretation Comments

 

             Lymphocytes # (test code = Lymphocytes 4.4          1.0-5.5        

           



             #)                                                  



[facility] RockIxuszqrWNOYTIETXY3501-32-34 18:40:00





             Test Item    Value        Reference Range Interpretation Comments

 

             Plt Morph (test code = Normal (17 1:40                        

   



             Plt Morph)   PM)                                    



[facility] [id number] 18:40:00





             Test Item    Value        Reference Range Interpretation Comments

 

             Segs (test code = Segs) 42.9         45.0-75.0                 



[facility] RockTgmjlggMTYVAAUHAZ7577-39-79 18:40:00





             Test Item    Value        Reference Range Interpretation Comments

 

             Lymphocytes (test code = Lymphocytes) 41.5         20.0-40.0       

          



[facility] RockEivjkimUUSHHGXXKN5552-58-14 18:40:00





             Test Item    Value        Reference Range Interpretation Comments

 

             MPV (test code = MPV) 10.7         7.4-10.4                  



[facility] RockOxoyangWAPUCHVMZF2613-38-38 18:40:00





             Test Item    Value        Reference Range Interpretation Comments

 

             RBC (test code = RBC) 4.52         4.70-6.10                 



[facility] Ro[id number] 18:40:00





             Test Item    Value        Reference Range Interpretation Comments

 

             MCHC (test code = MCHC) 33.8         32.0-36.0                 



[facility] RockRkxwsviFYCXAVCSIH4337-27-58 18:40:00





             Test Item    Value        Reference Range Interpretation Comments

 

             Platelet (test code = Platelet) 262          133-450               

    



[facility] RockTotjqkfVAZKKRPRRN5239-35-48 18:40:00





             Test Item    Value        Reference Range Interpretation Comments

 

             RDW (test code = RDW) 14.4         11.5-14.5                 



[facility] RockHzdukuhYWIFWPCROR7415-10-59 18:40:00





             Test Item    Value        Reference Range Interpretation Comments

 

             Hgb (test code = Hgb) 14.9         14.0-18.0                 



[facility] [id number] 18:40:00





             Test Item    Value        Reference Range Interpretation Comments

 

             Hct (test code = Hct) 44.1         42.0-54.0                 



[facility] RockMhnivxvINVVSSTFOW2956-26-92 18:40:00





             Test Item    Value        Reference Range Interpretation Comments

 

             MCV (test code = MCV) 97.6         80.0-94.0                 



[facility] RockJncyhqxFEWQKLYDFZ6575-16-74 18:40:00





             Test Item    Value        Reference Range Interpretation Comments

 

             MCH (test code = MCH) 33.0 pg      27.0-31.0                 



[facility] [id number] 18:40:00





             Test Item    Value        Reference Range Interpretation Comments

 

             WBC (test code = WBC) 10.6         3.7-10.4                  



[facility]



Notes







                Date/Time       Note            Provider        Source

 

                2019 16:17:00-00:00 Patient Name: JONO SUMMERS          

       [facility]



                                : 1955; Age: 64 years y/o Male           

      



                                MR: [id number]                    



                                Study: 3 view examination of the left wrist date

d 2019.                 



                                Clinical Indication: - pain/fell on left wrist y

esterday/swollen;                 



                                Comparison: None                 



                                                    Mild degenerative changes ar

e seen about the left 1st carpal metacarpal joint. 

Osteopenia. No fracture or dislocation. If there is snuffbox tenderness, cannot 
exclude an occult scaphoid fracture.                           



                                                                



                                SL: [id number]                     

 

                2017 10:21:42-00:00 Left ankle 3 views                 [facility]



                                HISTORY: Pain and swelling.                 



                                COMPARISON: None available.                 



                                FINDINGS:                       



                                                    No fracture or dislocation e

vident. Joint spaces appear within normal limits. 

No periosteal reaction. Chronic/degenerative changes noted. Prominent superior 
and inferior spurring of the posterior calcaneus present. Soft tissue swelling 
present.                                            



                                IMPRESSION:                     



                                1. No acute osseous abnormality.                

 



                                                                



                                SL: [id number]                     

 

                2017 14:20:00-00:00 EXAMINATION: MRI of the right knee wit

hout contrast.                 

 [facility] [place]



                                HISTORY: right knee internal derangement- M23.91

;                 



                                AGE: 62 years                   



                                GENDER: Male                    



                                COMPARISON: Right knee radiographs 2017   

              



                                                    TECHNIQUE: Multiplanar, mult

isequence magnetic resonance imaging of the right 

knee is performed with an extremity coil without contrast.                      

     



                                FINDINGS:                       



                                Menisci:                        



                                                    Medial: There is a radial te

ar of the posterior horn of the medial meniscus 

(image 14, series 301 and image 10 through 12, series 801). There is also 
horizontal undersurface tearing of the posterior bod                           



                                                    y of the medial meniscus wit

h a longitudinal femoral articular surface tear of 

the peripheral body (image 27, series 401 and image 8 and 9, series 801).       

                    



                                Lateral: The anterior horn, body, and posterior 

horn are intact.                 



                                                                



                                                    Ligaments: The anterior cruc

iate ligament and posterior cruciate ligament are 

intact. There is mild increased T2 signal along the medial collateral ligament 
which is favored to be reactive related to un                           



                                                    derlying medial meniscal pat

hology. The lateral collateral ligament complexes 

intact and unremarkable.                            



                                                                



                                Extensor mechanism: The extensor mechanism is in

tact.                 



                                                                



                                                    Muscles: There is normal sig

nal intensity and muscle bulk of the musculature at

the knee.                                           



                                                                



                                                    Cartilage: There is moderate

 reactive marrow change in the lateral patellar 

facet and patellar apex. Mild early osteophyte formation seen in the 
patellofemoral compartment. The patellofemoral articular                        

   



                                                    cartilage demonstrates grade

 2 chondral thinning in the lateral patellar facet 

with superimposed moderate chondral surface fraying. The medial tibiofemoral 
articular cartilage demonstrates a 10 x 15 mm                           



                                                    partial-thickness, moderate 

grade, focus of chondral loss in the lateral aspect

of the medial femoral condyle.. The lateral tibiofemoral articular cartilage is 
intact..                                            



                                                    Bone: There are no acute fra

ctures. There are no suspicious bone marrow 

replacing lesions.                                  



                                                                



                                                    Soft tissues: There is a mod

erate knee joint effusion. There is no Baker's 

cyst. Multiple venous varicosities are seen in the posterior soft tissues of the
knee.                                               



                                IMPRESSION:                     



                                                    1. Complex multidirectional 

tearing of the posterior horn and body of the 

medial meniscus as described above.                           



                                                    2. Moderate grade, approxima

tely 50% thickness, 10 x 15 mm focus of chondral 

loss in the lateral aspect of the medial femoral condyle.                       

    



                                                    3. Grade II chondromalacia i

n the lateral patellar facet with superimposed 

moderate chondral surface fraying. There is moderate underlying reactive marrow 
change in the lateral patellar facet.                           



                                4. Moderate knee joint effusion.                

 



                                                    5. Prominent venous varicosi

ties in the posterior soft tissues of the left 

knee.                                               

 

                2017 16:03:04-00:00 Exam: Right knee x-ray, 2 views       

          [facility]



                                Reason for Exam: PAIN                 



                                Comparison Exam: none                 



                                Discussion:                     



                                                    No fractures or dislocations

 are seen of the right knee. The joint spaces are 

preserved. No intraosseous lesions. No radiopaque foreign bodies.               

            



                                                                



                                Impression:                     



                                1. No acute bony abnormalities seen within the r

ight knee.                 

 

                2016-10-17 16:25:45-00:00 EXAMINATION: Right foot frontal and la

teral.                 [facility]



                                HISTORY: Right foot pain; right foot soft tissue

 swelling; right heel spur                 



                                                    FINDINGS: 2 view nonweightbe

aring examination of the right foot is performed 

without comparison.                                 



                                                    There is soft tissue swellin

g along the dorsum of the foot. There is no 

underlying fracture or dislocation. There is an os peroneum. The joint spaces 
are normal. There is no ankle effusion. There is a p                           



                                                    lantar calcaneal spur. There

 is heterotopic ossification at the Achilles 

insertion. There are no radiopaque foreign bodies identified.                   

        



                                IMPRESSION:                     



                                                    1. Soft tissue swelling blanca

g the dorsum of the right foot without underlying 

fracture or dislocation.                            



                                2. Right plantar calcaneal spur.

## 2023-07-18 NOTE — P.CNS
Date of Consult: 23


Chief Complaint: Left hip pain.


History of Present Illness: 





at 7:10 am today 2023 while rolling his trash can down to the curb at home 

on a downward incline, he tripped on his crocs sandles and fell on the concrete,

He could not get up, he tried to crawl for hilp, eventually he flagged down a 

passing neighbot that got him help, he was transported to the ED via ambulance. 

he has HTN, GERD, DM, and sleep apnea. 


Allergies





ibuprofen Allergy (Verified 23 11:34)


   Itching





Home Medications: 








Atorvastatin Calcium 1 tab PO BEDTIME 23 


Carvedilol [Coreg] 1 tab PO BID 23 


Esomeprazole Mag Trihydrate [Nexium] 1 tab PO BEDTIME 23 


Levothyroxine [Synthroid*] 1 tab PO DAILY 23 


Metformin HCl [Glucophage*] 1 tab PO BID 23 








- Past Medical/Surgical History


Diabetic: No


-: HTN


-: HLD


-: TONY


-: Hypothyroidism


-: Borderline diabetes


-: Hypothyroidism


-: Skin CA


-: Splenectomy


-: Bilateral rotator cuff surgery


-: Bilateral Rotator Cuff replacement


-: Bilateral MCL Replacement





- Family History


  ** Mother


Medical History: Diabetes





  ** Father


Medical History: Hypertension, Cancer





- Social History


Alcohol use: No


CD- Drugs: No


Caffeine use: No


Place of Residence: Home





Review of Systems


10-point ROS is otherwise unremarkable





Physical Examination














Temp Pulse Resp BP Pulse Ox


 


 100.4 F   86   22 H  133/82   96 


 


 23 17:11  23 16:00  23 16:00  23 16:00  23 16:00








General: Alert, In no apparent distress


HEENT: Atraumatic, Normocephalic


Neck: Supple


Respiratory: Normal air movement


Capillary refill: <2 Seconds


Musculoskeletal: Other (heft hip pain)


Integumentary: No rashes


Neurological: Normal speech


Laboratory Data (last 24 hrs)





23 10:28: WBC 8.90, Hgb 13.4 L, Hct 40.0, Plt Count 236


23 10:28: Sodium 135 L, Potassium 4.1, BUN 15, Creatinine 1.33 H, Glucose 

119 H





Imagings Data: 


Name: JONO BARNETT Acct Number: [id number] 


: 1955 Age: 68 Sex: M


Unit Number: [id number]


Ord Phys: KailashDarya [facility] Care Dr: NONE


Status: REG ER ER Accession #: [id number]


Exam Date: 23


Reason for Exam: Fall


Report Status: Signed


EXAM DESCRIPTION: RAD - Hip Left 2 View - 2023 9:24 am


CLINICAL HISTORY: Left hip pain status post injury


FINDINGS: Intertrochanteric/greater trochanteric fracture left femur. Fracture 

fragments  by 1 


centimeter.


No dislocation


Dictated By: Chucky Gray MD 23 [id number]


Signed By: Chucky Gray MD 23 094





- Problems


(1) Intertrochanteric fracture of left femur


Current Visit: Yes   Status: Acute   


Qualifiers: 


   Encounter type: initial encounter   Fracture type: closed   Fracture 

alignment: displaced   Qualified Code(s): S72.142A - Displaced intertrochanteric

 fracture of left femur, initial encounter for closed fracture

## 2023-07-18 NOTE — P.HP
Certification for Inpatient


Patient admitted to: Inpatient


With expected LOS: >2 Midnights


Patient will require the following post-hospital care: None


Practitioner: I am a practitioner with admitting privileges, knowledge of 

patient current condition, hospital course, and medical plan of care.


Services: Services provided to patient in accordance with Admission requirements

found in Title 42 Section 412.3 of the Code of Federal Regulations





Patient History


Date of Service: 07/18/23


Reason for admission: Left hip pain.


History of Present Illness: 


Patient is a 68-year-old male with a past medical history significant for 

hypertension, hypothyroidism, hyperlipidemia, TONY who presents with complaint of

left hip pain status post fall.  Patient reported that he was picking up garbage

this morning when his foot got tangled and he fell.  Patient reported he fell on

his left side.  Patient reported that he felt pain in the left hip and was 

unable to get up.  Patient denies hitting his head or losing consciousness.  

Patient rated pain as 8/10 in severity and described pain as aching in quality. 

Patient denies any other signs and symptoms.  Symptoms are aggravated by 

movement and relieved by nothing.  Patient was brought to the hospital for 

medical evaluation.





Allergies





ibuprofen Allergy (Verified 07/18/23 11:34)


   Itching





Home Medications: 








Atorvastatin Calcium 1 tab PO BEDTIME 07/18/23 


Carvedilol [Coreg] 1 tab PO BID 07/18/23 


Esomeprazole Mag Trihydrate [Nexium] 1 tab PO BEDTIME 07/18/23 


Levothyroxine [Synthroid*] 1 tab PO DAILY 07/18/23 


Metformin HCl [Glucophage*] 1 tab PO BID 07/18/23 








- Past Medical/Surgical History


-: HTN


-: HLD


-: TONY


-: Hypothyroidism


-: Borderline diabetes


-: Splenectomy


-: Bilateral rotator cuff surgery





- Family History


  ** Mother


-: Diabetes





  ** Father


-: Hypertension, Cancer





- Social History


Smoking Status: Never smoker


Alcohol use: No


CD- Drugs: No


Caffeine use: No


Place of Residence: Home





Review of Systems


General: Unremarkable


Eyes: Unremarkable


ENT: Unremarkable


Respiratory: Unremarkable


Cardiovascular: Unremarkable


Gastrointestinal: Unremarkable


Genitourinary: Unremarkable


Musculoskeletal: Other (Left hip pain)


Integumentary: Unremarkable


Neurological: Unremarkable


Lymphatics: Unremarkable





Physical Examination





- Physical Exam


General: Alert, In no apparent distress, Oriented x3, Cooperative


HEENT: Atraumatic, PERRLA, Mucous membr. moist/pink, EOMI, Sclerae nonicteric


Neck: Supple, 2+ carotid pulse no bruit, No LAD, Without JVD or thyroid 

abnormality


Respiratory: Clear to auscultation bilaterally, Normal air movement


Cardiovascular: No edema, Regular rate/rhythm, Normal S1 S2


Capillary refill: <2 Seconds


Gastrointestinal: Normal bowel sounds, Non-distended, No tenderness


Musculoskeletal: Tenderness (Left hip)


Integumentary: No rashes, No significant lesion


Neurological: Normal speech, Normal tone, Normal affect


Lymphatics: No axilla or inguinal lymphadenopathy





- Studies


Laboratory Data (last 24 hrs)





07/18/23 10:28: WBC 8.90, Hgb 13.4 L, Hct 40.0, Plt Count 236


07/18/23 10:28: Sodium 135 L, Potassium 4.1, BUN 15, Creatinine 1.33 H, Glucose 

119 H








Assessment and Plan





- Plan


--Left hip fracture.  Hip imaging indicates Intertrochanteric/greater 

trochanteric fracture left femur.  Orthopedic surgeon consulted.  Plans left 

hip surgery in AM.  We will await further recommendation from surgeon.


-- Acute pain.  We will manage pain with current pain medication regimen.


--Hypertension.  Stable.  Continue home medication.


--Hypothyroidism.  Continue Synthroid.


--TONY.  CPAP at bedtime.


--Hyperlipidemia.  Continue statin.


--GERD.  Continue home medications.


--Class I obesity.  Likely secondary to excess calories intake.  Patient 

counseled on weight reduction, diet and excise therapy.


--DVT prophylaxis with heparin subQ





Discharge Plan: Home


Plan to discharge in: Greater than 2 days





- Advance Directives


Does patient have a Living Will: No


Does patient have a Durable POA for Healthcare: No





- Code Status/Comfort Care


Code Status Assessed: Yes


Physician Review: Patient Assessed, Agree with Above Assessment and Plan


Critical Care: No

## 2023-07-18 NOTE — RAD REPORT
EXAM DESCRIPTION:  Demetrio Single View7/18/2023 10:40 am

 

CLINICAL HISTORY:  Preop for hip surgery

 

COMPARISON:  none

 

FINDINGS:   The lungs appear clear of acute infiltrate. The heart may be borderline enlarged

 

IMPRESSION:  No acute abnormalities displayed

## 2023-07-19 LAB
BUN BLD-MCNC: 16 MG/DL (ref 7–18)
GLUCOSE SERPLBLD-MCNC: 113 MG/DL (ref 74–106)
HCT VFR BLD CALC: 38.8 % (ref 39.6–49)
LYMPHOCYTES # SPEC AUTO: 3.5 K/UL (ref 0.7–4.9)
MCV RBC: 99.1 FL (ref 80–100)
PMV BLD: 10 FL (ref 7.6–11.3)
POTASSIUM SERPL-SCNC: 4.1 MEQ/L (ref 3.5–5.1)
RBC # BLD: 3.91 M/UL (ref 4.33–5.43)

## 2023-07-19 RX ADMIN — HYDROMORPHONE HYDROCHLORIDE ONE MG: 1 INJECTION, SOLUTION INTRAMUSCULAR; INTRAVENOUS; SUBCUTANEOUS at 18:46

## 2023-07-19 RX ADMIN — Medication SCH ML: at 08:13

## 2023-07-19 RX ADMIN — ATORVASTATIN CALCIUM SCH MG: 20 TABLET, FILM COATED ORAL at 20:35

## 2023-07-19 RX ADMIN — MEPERIDINE HYDROCHLORIDE ONE MG: 25 INJECTION, SOLUTION INTRAMUSCULAR; INTRAVENOUS; SUBCUTANEOUS at 19:00

## 2023-07-19 RX ADMIN — MEPERIDINE HYDROCHLORIDE ONE MG: 25 INJECTION, SOLUTION INTRAMUSCULAR; INTRAVENOUS; SUBCUTANEOUS at 18:07

## 2023-07-19 RX ADMIN — HYDROMORPHONE HYDROCHLORIDE PRN MG: 1 INJECTION, SOLUTION INTRAMUSCULAR; INTRAVENOUS; SUBCUTANEOUS at 06:08

## 2023-07-19 RX ADMIN — FENTANYL CITRATE ONE MCG: 50 INJECTION, SOLUTION INTRAMUSCULAR; INTRAVENOUS at 18:33

## 2023-07-19 RX ADMIN — Medication SCH: at 20:41

## 2023-07-19 RX ADMIN — HYDROMORPHONE HYDROCHLORIDE ONE MG: 1 INJECTION, SOLUTION INTRAMUSCULAR; INTRAVENOUS; SUBCUTANEOUS at 18:52

## 2023-07-19 RX ADMIN — FENTANYL CITRATE ONE MCG: 50 INJECTION, SOLUTION INTRAMUSCULAR; INTRAVENOUS at 18:30

## 2023-07-19 RX ADMIN — HYDROMORPHONE HYDROCHLORIDE PRN MG: 1 INJECTION, SOLUTION INTRAMUSCULAR; INTRAVENOUS; SUBCUTANEOUS at 10:13

## 2023-07-19 RX ADMIN — PANTOPRAZOLE SODIUM SCH MG: 40 TABLET, DELAYED RELEASE ORAL at 20:35

## 2023-07-19 RX ADMIN — LEVOTHYROXINE SODIUM SCH MG: 112 TABLET ORAL at 06:08

## 2023-07-19 RX ADMIN — HYDROMORPHONE HYDROCHLORIDE ONE MG: 1 INJECTION, SOLUTION INTRAMUSCULAR; INTRAVENOUS; SUBCUTANEOUS at 19:06

## 2023-07-19 RX ADMIN — HYDROMORPHONE HYDROCHLORIDE ONE MG: 1 INJECTION, SOLUTION INTRAMUSCULAR; INTRAVENOUS; SUBCUTANEOUS at 19:12

## 2023-07-19 RX ADMIN — FENTANYL CITRATE ONE MCG: 50 INJECTION, SOLUTION INTRAMUSCULAR; INTRAVENOUS at 18:24

## 2023-07-19 RX ADMIN — HEPARIN SODIUM SCH: 5000 INJECTION, SOLUTION INTRAVENOUS; SUBCUTANEOUS at 09:00

## 2023-07-19 RX ADMIN — HYDROMORPHONE HYDROCHLORIDE PRN MG: 1 INJECTION, SOLUTION INTRAMUSCULAR; INTRAVENOUS; SUBCUTANEOUS at 14:16

## 2023-07-19 RX ADMIN — SODIUM CHLORIDE SCH MLS: 0.45 INJECTION, SOLUTION INTRAVENOUS at 20:34

## 2023-07-19 RX ADMIN — HYDROMORPHONE HYDROCHLORIDE ONE MG: 1 INJECTION, SOLUTION INTRAMUSCULAR; INTRAVENOUS; SUBCUTANEOUS at 18:58

## 2023-07-19 NOTE — RAD REPORT
EXAM DESCRIPTION:  Hip in OR Left 2 View7/19/2023 6:52 pm

 

CLINICAL HISTORY:  Device placement endotracheal tube placement

 

IMPRESSION:  Thirty-three intraoperative fluoroscopic spot images.

 

Fluoroscopy time 1.6 minutes

 

Left hip rodding performed.

 

Surgery done by Dr. Valencia

## 2023-07-19 NOTE — CON
Reason For Consultation:  Left hip fracture.



History Of Present Illness:  Mr. Summers is a 68-year-old gentleman, who sustained a fall and sustaine
d intertrochanteric left hip fracture.  He was wearing flip-flops __________ we were asked to evaluat
e for his left hip.



Past Medical History:  Significant for hypertensive disorder, moderate obesity, hypercholesterolemia,
 decreased thyroid function.



Allergies:  HE IS ALLERGIC TO IBUPROFEN.



Physical Examination:

General:  BMI is 33.4.  He is supine in bed.  Appears to be neurovascular intact.  Alert and oriented
 x3. 

Abdomen:  Obese. 

Extremities:  Left lower extremity is externally rotated.  He is not moved secondary to known fractur
e.



Laboratory Data:  X-rays revealed multi part intertrochanteric left hip fracture and surgical wound.



Plan:  Intramedullary rodding of left hip.





WEN/ANUPAMA

DD:  07/19/2023 17:06:17Voice ID:  [id number]

DT:  07/19/2023 17:57:49Report ID:  [id number]

## 2023-07-19 NOTE — EKG
Test Date:    2023-07-18               Test Time:    10:27:07

Technician:   BRIGIDA                                    

                                                     

MEASUREMENT RESULTS:                                       

Intervals:                                           

Rate:         74                                     

OR:           206                                    

QRSD:         90                                     

QT:           394                                    

QTc:          437                                    

Axis:                                                

P:            74                                     

OR:           206                                    

QRS:          34                                     

T:            33                                     

                                                     

INTERPRETIVE STATEMENTS:                                       

                                                     

Normal sinus rhythm

Normal ECG

No previous ECG available for comparison



Electronically Signed On 07-19-23 13:15:45 CDT by Manish Hope

## 2023-07-19 NOTE — OP
DOS:  7/19/2023



Surgeon:  Chucky Verdugo MD



Assistant:  First assistant, TOÑA Liao.



Preoperative Diagnosis:  Left intertrochanteric hip fracture.



Postoperative Diagnosis:  Left intertrochanteric hip fracture.



Procedure Performed:  Left IT hip fracture __________.



Complications:  None.



Disposition:  To recovery room, stable.



Procedure In Detail:  The patient was taken to the operative suite, placed in 
supine position, and induced anesthesia.  Estimated Blood Loss 

was minimal throughout this case.  I did a skin incision over the greater 
trochanter.  Hemostasis was verified.  Dissection to the tensor fascia teresita was 
performed.  Piriformis was encountered.  The trochanter reaming awl was utilized
to introduce the guidewire.  A 125 x 11 mm x 180 mm marino was then introduced and 
verified on biplanar radiography.  A 105 mm lag screw was then placed in 
subchondral bone of the femoral head.  The distal interlocking was performed 
with a 36 mm screw.  The patient tolerated the procedure well, reversed from 
anesthesia after layered closure was being performed at the time of this 
dictation.





WEN/ANUPAMA

DD:  07/19/2023 17:35:05   Voice ID:  [id number]

DT:  07/19/2023 18:14:12   Report ID:  [id number]

HERNÁN

## 2023-07-19 NOTE — P.PN
Subjective


Date of Service: 07/19/23


Chief Complaint: Left hip pain.


No acute events overnight. He reports left hip pain. He denies any chest pain, 

palpitations, or shortness of breath. Plan for hip repair today with Dr. Verdugo this evening. He is NPO past 10:00 am.





Review of Systems


10-point ROS is otherwise unremarkable


Musculoskeletal: Leg Pain (left hip)





Physical Examination





- Vital Signs


Temperature: 98.9 F


Blood Pressure: 105/70


Pulse: 74


Respirations: 16


Pulse Ox (%): 99





- Physical Exam


General: Alert, In no apparent distress, Oriented x3


HEENT: Atraumatic, Mucous membr. moist/pink, Sclerae nonicteric


Neck: JVD not distended


Respiratory: Clear to auscultation bilaterally, Normal air movement


Cardiovascular: No edema, Regular rate/rhythm, Normal S1 S2, No gallops, No 

rubs, No murmurs


Gastrointestinal: Normal bowel sounds, Soft and benign, Non-distended, No 

tenderness, No rebound, No guarding


Musculoskeletal: No clubbing, Tenderness (left hip)


Integumentary: No rashes


Neurological: Normal speech, Normal affect





Assessment And Plan





- Plan


# Traumatic Ground-Level Fall complicated by Left Intertrochanteric/Greater 

Trochanteric Femur Fracture


- Left hip x-ray = "intertrochanteric/greater trochanteric fracture left femur. 

Fracture fragments  by 1 centimeter. No dislocation."


- Orthopedic Surgery consulted and he was evaluated by Dr. Verdugo - 

recommendations appreciated


   - Plan for surgery this evening


- PRN pain medicine


- NPO past 10:00 am


- Will require post-operative PT





# Hypertension


- Continue home carvedilol





# Hypothyroidism


- Continue home levothyroxine





# Hyperlipidemia


- Continue home atorvastatin





# Obstructive Sleep Apnea


- May use home CPAP








Giuseppe Gama M.D.

## 2023-07-19 NOTE — RAD REPORT
EXAM DESCRIPTION:  RAD - Pelvis - 7/19/2023 6:14 pm

 

CLINICAL HISTORY:  Left femoral fracture

 

FINDINGS:  Compression screw and intramedullary marino affix a left femoral fracture.

 

No dislocation

## 2023-07-20 LAB
BUN BLD-MCNC: 20 MG/DL (ref 7–18)
GLUCOSE SERPLBLD-MCNC: 101 MG/DL (ref 74–106)
HCT VFR BLD CALC: 37.9 % (ref 39.6–49)
LYMPHOCYTES # SPEC AUTO: 3.5 K/UL (ref 0.7–4.9)
MCV RBC: 99.7 FL (ref 80–100)
PMV BLD: 9.6 FL (ref 7.6–11.3)
POTASSIUM SERPL-SCNC: 4.2 MEQ/L (ref 3.5–5.1)
RBC # BLD: 3.8 M/UL (ref 4.33–5.43)

## 2023-07-20 RX ADMIN — Medication SCH ML: at 21:00

## 2023-07-20 RX ADMIN — SODIUM CHLORIDE SCH MLS: 9 INJECTION, SOLUTION INTRAVENOUS at 07:59

## 2023-07-20 RX ADMIN — HYDROCODONE BITARTRATE AND ACETAMINOPHEN PRN TAB: 10; 325 TABLET ORAL at 21:35

## 2023-07-20 RX ADMIN — PANTOPRAZOLE SODIUM SCH MG: 40 TABLET, DELAYED RELEASE ORAL at 21:31

## 2023-07-20 RX ADMIN — MORPHINE SULFATE PRN MG: 2 INJECTION, SOLUTION INTRAMUSCULAR; INTRAVENOUS at 05:34

## 2023-07-20 RX ADMIN — Medication SCH: at 08:01

## 2023-07-20 RX ADMIN — MORPHINE SULFATE PRN MG: 2 INJECTION, SOLUTION INTRAMUSCULAR; INTRAVENOUS at 10:12

## 2023-07-20 RX ADMIN — HYDROCODONE BITARTRATE AND ACETAMINOPHEN PRN TAB: 10; 325 TABLET ORAL at 07:20

## 2023-07-20 RX ADMIN — ATORVASTATIN CALCIUM SCH MG: 20 TABLET, FILM COATED ORAL at 21:30

## 2023-07-20 RX ADMIN — LEVOTHYROXINE SODIUM SCH MG: 112 TABLET ORAL at 05:34

## 2023-07-20 RX ADMIN — ACETAMINOPHEN PRN MG: 325 TABLET ORAL at 17:52

## 2023-07-20 RX ADMIN — ENOXAPARIN SODIUM SCH MG: 30 INJECTION SUBCUTANEOUS at 05:34

## 2023-07-20 RX ADMIN — HYDROCODONE BITARTRATE AND ACETAMINOPHEN PRN TAB: 10; 325 TABLET ORAL at 12:48

## 2023-07-20 RX ADMIN — MORPHINE SULFATE PRN MG: 2 INJECTION, SOLUTION INTRAMUSCULAR; INTRAVENOUS at 16:08

## 2023-07-20 RX ADMIN — SODIUM CHLORIDE SCH MLS: 9 INJECTION, SOLUTION INTRAVENOUS at 01:20

## 2023-07-20 RX ADMIN — ENOXAPARIN SODIUM SCH MG: 30 INJECTION SUBCUTANEOUS at 17:53

## 2023-07-20 RX ADMIN — Medication SCH ML: at 21:26

## 2023-07-20 NOTE — P.PN
Subjective


Date of Service: 07/20/23


Chief Complaint: Left hip pain.


Subjective: No new changes, Tolerating diet, Improving, Working w/ PT





Review of Systems


10-point ROS is otherwise unremarkable





Physical Examination





- Vital Signs


Temperature: 100.8 F


Blood Pressure: 116/75


Pulse: 78


Respirations: 19


Pulse Ox (%): 95





- Studies


[id number]





Assessment And Plan





- Current Problems (Diagnosis)


(1) Intertrochanteric fracture of left femur


Current Visit: Yes   Status: Acute   


Qualifiers: 


   Encounter type: subsequent encounter   Fracture type: closed   Fracture 

alignment: displaced   Fracture healing: with routine healing   Qualified 

Code(s): S72.142D - Displaced intertrochanteric fracture of left femur, 

subsequent encounter for closed fracture with routine healing   


Plan to discharge in: 24 Hours


Physician Review: Patient Assessed, Agree with Above Assessment and Plan

## 2023-07-20 NOTE — P.PN
Subjective


Date of Service: 07/20/23


Chief Complaint: Left hip pain.


POD#1 from left femur fracture repair. He is doing well this morning. He reports

that his pain is well-controlled. He has been working well with PT, who 

recommended inpatient rehab placement. Appreciate CM assistance. 





Review of Systems


10-point ROS is otherwise unremarkable


Musculoskeletal: Leg Pain (left hip)





Physical Examination





- Vital Signs


Temperature: 97.5 F


Blood Pressure: 116/69


Pulse: 87


Respirations: 18


Pulse Ox (%): 98





Assessment And Plan





- Plan





- Physical Exam


General: Alert, In no apparent distress, Oriented x3


HEENT: Atraumatic, Mucous membr. moist/pink, Sclerae nonicteric


Neck: JVD not distended


Respiratory: Clear to auscultation bilaterally, Normal air movement


Cardiovascular: No edema, Regular rate/rhythm, No murmurs


Gastrointestinal: Normal bowel sounds, Soft, Non-distended, No tenderness


Musculoskeletal: No clubbing, Tenderness (left hip)


Integumentary: No rashes


Neurological: Normal speech, Normal affect








# Traumatic Ground-Level Fall complicated by Left Intertrochanteric/Greater 

Trochanteric Femur Fracture


- Left hip x-ray = "intertrochanteric/greater trochanteric fracture left femur. 

Fracture fragments  by 1 centimeter. No dislocation."


- Orthopedic Surgery consulted and he was evaluated by Dr. Verdugo - 

recommendations appreciated


   - s/p left hip repair on 07/19/2023


- PRN pain medicine


- PT consulted - recommended inpatient rehab. Appreciate CM assistance





# Hypertension


- Continue home carvedilol





# Hypothyroidism


- Continue home levothyroxine





# Hyperlipidemia


- Continue home atorvastatin





# Obstructive Sleep Apnea


- May use home CPAP








Giuseppe Gama M.D.

## 2023-07-21 VITALS — OXYGEN SATURATION: 96 %

## 2023-07-21 LAB
BUN BLD-MCNC: 21 MG/DL (ref 7–18)
GLUCOSE SERPLBLD-MCNC: 96 MG/DL (ref 74–106)
HCT VFR BLD CALC: 37.1 % (ref 39.6–49)
LYMPHOCYTES # SPEC AUTO: 5.6 K/UL (ref 0.7–4.9)
MCV RBC: 99.4 FL (ref 80–100)
PMV BLD: 10.3 FL (ref 7.6–11.3)
POTASSIUM SERPL-SCNC: 4.2 MEQ/L (ref 3.5–5.1)
RBC # BLD: 3.73 M/UL (ref 4.33–5.43)

## 2023-07-21 RX ADMIN — ENOXAPARIN SODIUM SCH MG: 30 INJECTION SUBCUTANEOUS at 09:04

## 2023-07-21 RX ADMIN — HYDROCODONE BITARTRATE AND ACETAMINOPHEN PRN TAB: 10; 325 TABLET ORAL at 04:53

## 2023-07-21 RX ADMIN — PANTOPRAZOLE SODIUM SCH MG: 40 TABLET, DELAYED RELEASE ORAL at 21:19

## 2023-07-21 RX ADMIN — SODIUM CHLORIDE SCH MLS: 0.45 INJECTION, SOLUTION INTRAVENOUS at 04:52

## 2023-07-21 RX ADMIN — SODIUM CHLORIDE, SODIUM LACTATE, POTASSIUM CHLORIDE, AND CALCIUM CHLORIDE SCH MLS: .6; .31; .03; .02 INJECTION, SOLUTION INTRAVENOUS at 17:18

## 2023-07-21 RX ADMIN — LEVOTHYROXINE SODIUM SCH MG: 112 TABLET ORAL at 09:04

## 2023-07-21 RX ADMIN — ENOXAPARIN SODIUM SCH MG: 30 INJECTION SUBCUTANEOUS at 17:14

## 2023-07-21 RX ADMIN — HYDROCODONE BITARTRATE AND ACETAMINOPHEN PRN TAB: 10; 325 TABLET ORAL at 21:20

## 2023-07-21 RX ADMIN — ATORVASTATIN CALCIUM SCH MG: 20 TABLET, FILM COATED ORAL at 21:20

## 2023-07-21 RX ADMIN — Medication SCH ML: at 09:03

## 2023-07-21 RX ADMIN — HYDROCODONE BITARTRATE AND ACETAMINOPHEN PRN TAB: 10; 325 TABLET ORAL at 14:55

## 2023-07-22 LAB
BLD SMEAR INTERP: (no result)
BUN BLD-MCNC: 18 MG/DL (ref 7–18)
GLUCOSE SERPLBLD-MCNC: 89 MG/DL (ref 74–106)
HCT VFR BLD CALC: 36.3 % (ref 39.6–49)
LYMPHOCYTES # SPEC AUTO: 6.8 K/UL (ref 0.7–4.9)
MCV RBC: 99.5 FL (ref 80–100)
MORPHOLOGY BLD-IMP: (no result)
PMV BLD: 9.8 FL (ref 7.6–11.3)
POTASSIUM SERPL-SCNC: 4.3 MEQ/L (ref 3.5–5.1)
RBC # BLD: 3.65 M/UL (ref 4.33–5.43)

## 2023-07-22 RX ADMIN — Medication SCH ML: at 21:00

## 2023-07-22 RX ADMIN — ENOXAPARIN SODIUM SCH MG: 30 INJECTION SUBCUTANEOUS at 06:11

## 2023-07-22 RX ADMIN — ACETAMINOPHEN PRN MG: 325 TABLET ORAL at 21:03

## 2023-07-22 RX ADMIN — SODIUM CHLORIDE, SODIUM LACTATE, POTASSIUM CHLORIDE, AND CALCIUM CHLORIDE SCH MLS: .6; .31; .03; .02 INJECTION, SOLUTION INTRAVENOUS at 12:12

## 2023-07-22 RX ADMIN — LEVOTHYROXINE SODIUM SCH MG: 112 TABLET ORAL at 06:11

## 2023-07-22 RX ADMIN — SODIUM CHLORIDE, SODIUM LACTATE, POTASSIUM CHLORIDE, AND CALCIUM CHLORIDE SCH MLS: .6; .31; .03; .02 INJECTION, SOLUTION INTRAVENOUS at 21:04

## 2023-07-22 RX ADMIN — SODIUM CHLORIDE, SODIUM LACTATE, POTASSIUM CHLORIDE, AND CALCIUM CHLORIDE SCH MLS: .6; .31; .03; .02 INJECTION, SOLUTION INTRAVENOUS at 01:36

## 2023-07-22 RX ADMIN — ATORVASTATIN CALCIUM SCH MG: 20 TABLET, FILM COATED ORAL at 21:04

## 2023-07-22 RX ADMIN — PANTOPRAZOLE SODIUM SCH MG: 40 TABLET, DELAYED RELEASE ORAL at 21:04

## 2023-07-22 RX ADMIN — HYDROCODONE BITARTRATE AND ACETAMINOPHEN PRN TAB: 10; 325 TABLET ORAL at 08:43

## 2023-07-22 RX ADMIN — ENOXAPARIN SODIUM SCH MG: 30 INJECTION SUBCUTANEOUS at 19:24

## 2023-07-22 RX ADMIN — Medication SCH ML: at 08:44

## 2023-07-22 NOTE — P.PN
Subjective


Date of Service: 07/22/23


Chief Complaint: Left hip pain.


POD#2 from left femur fracture repair. He has been working well with PT. 

Appreciate CM assistance with placement. 





Review of Systems


10-point ROS is otherwise unremarkable


Musculoskeletal: Leg Pain (left hip)





Physical Examination





- Vital Signs


Temperature: 99.0 F


Blood Pressure: 115/76


Pulse: 78


Respirations: 16


Pulse Ox (%): 98





Assessment And Plan





- Plan





- Physical Exam


General: Alert, In no apparent distress, Oriented x3


HEENT: Atraumatic, Mucous membr. moist/pink, Sclerae nonicteric


Neck: JVD not distended


Respiratory: Clear to auscultation bilaterally, Normal air movement


Cardiovascular: No edema, Regular rate/rhythm, No murmurs


Gastrointestinal: Normal bowel sounds, Soft, Non-distended, No tenderness


Musculoskeletal: No clubbing, Tenderness (left hip)


Integumentary: No rashes


Neurological: Normal speech, Normal affect








# Traumatic Ground-Level Fall complicated by Left Intertrochanteric/Greater 

Trochanteric Femur Fracture


- Left hip x-ray = "intertrochanteric/greater trochanteric fracture left femur. 

Fracture fragments  by 1 centimeter. No dislocation."


- Orthopedic Surgery consulted and he was evaluated by Dr. Verdugo - 

recommendations appreciated


   - s/p left hip repair on 07/19/2023


- PRN pain medicine


- PT consulted - recommended inpatient rehab. Appreciate CM assistance





# Orthostatic Hypotension with history of Hypertension


-  mL x 1, followed by LR @ 100 mL/hr


- Hold home carvedilol





# Hypothyroidism


- Continue home levothyroxine





# Hyperlipidemia


- Continue home atorvastatin





# Obstructive Sleep Apnea


- May use home CPAP








Giuseppe Gama M.D.

## 2023-07-22 NOTE — P.PN
Subjective


Date of Service: 07/22/23


Chief Complaint: Left hip pain.


POD#2 left hip intramedullary marino fixation. No new changes. Awaiting placement 

for inpatient rehab.





Review of Systems


10-point ROS is otherwise unremarkable


Musculoskeletal: Leg Pain (Left hip)





Physical Examination





- Vital Signs


Temperature: 98.9 F


Blood Pressure: 122/68


Pulse: 77


Respirations: 18


Pulse Ox (%): 95





Assessment And Plan





- Plan





- Physical Exam


General: Alert, In no apparent distress, Oriented x3


HEENT: Atraumatic, Mucous membr. moist/pink, Sclerae nonicteric


Neck: JVD not distended


Respiratory: Clear to auscultation bilaterally, Normal air movement


Cardiovascular: No edema, Regular rate/rhythm, No murmurs


Gastrointestinal: Normal bowel sounds, Soft, Non-distended, No tenderness


Musculoskeletal: No clubbing, Tenderness (left hip)


Integumentary: No rashes


Neurological: Normal speech, Normal affect








# Traumatic Ground-Level Fall complicated by Left Intertrochanteric/Greater 

Trochanteric Femur Fracture


- Left hip x-ray = "intertrochanteric/greater trochanteric fracture left femur. 

Fracture fragments  by 1 centimeter. No dislocation."


- Orthopedic Surgery consulted and he was evaluated by Dr. Verdugo - 

recommendations appreciated


   - s/p left hip intramedullary marino fixation on 07/19/2023


- PRN pain medicine


- PT consulted - recommended inpatient rehab. Appreciate CM assistance





# Orthostatic Hypotension with history of Hypertension


-  mL x 1, followed by LR @ 100 mL/hr


- Hold home carvedilol





# Hypothyroidism


- Continue home levothyroxine





# Hyperlipidemia


- Continue home atorvastatin





# Obstructive Sleep Apnea


- May use home CPAP








Giuseppe Gama M.D.

## 2023-07-23 LAB
BUN BLD-MCNC: 15 MG/DL (ref 7–18)
GLUCOSE SERPLBLD-MCNC: 87 MG/DL (ref 74–106)
HCT VFR BLD CALC: 36.5 % (ref 39.6–49)
LYMPHOCYTES # SPEC AUTO: 6.8 K/UL (ref 0.7–4.9)
MCV RBC: 99.3 FL (ref 80–100)
PMV BLD: 9.7 FL (ref 7.6–11.3)
POTASSIUM SERPL-SCNC: 4.1 MEQ/L (ref 3.5–5.1)
RBC # BLD: 3.68 M/UL (ref 4.33–5.43)

## 2023-07-23 RX ADMIN — LEVOTHYROXINE SODIUM SCH MG: 112 TABLET ORAL at 05:56

## 2023-07-23 RX ADMIN — PANTOPRAZOLE SODIUM SCH MG: 40 TABLET, DELAYED RELEASE ORAL at 20:26

## 2023-07-23 RX ADMIN — Medication SCH ML: at 20:26

## 2023-07-23 RX ADMIN — ENOXAPARIN SODIUM SCH MG: 30 INJECTION SUBCUTANEOUS at 17:06

## 2023-07-23 RX ADMIN — ACETAMINOPHEN PRN MG: 325 TABLET ORAL at 05:58

## 2023-07-23 RX ADMIN — ATORVASTATIN CALCIUM SCH MG: 20 TABLET, FILM COATED ORAL at 20:26

## 2023-07-23 RX ADMIN — SODIUM CHLORIDE, SODIUM LACTATE, POTASSIUM CHLORIDE, AND CALCIUM CHLORIDE SCH MLS: .6; .31; .03; .02 INJECTION, SOLUTION INTRAVENOUS at 06:54

## 2023-07-23 RX ADMIN — ENOXAPARIN SODIUM SCH MG: 30 INJECTION SUBCUTANEOUS at 05:56

## 2023-07-23 RX ADMIN — ACETAMINOPHEN PRN MG: 325 TABLET ORAL at 17:12

## 2023-07-23 RX ADMIN — Medication SCH ML: at 07:47

## 2023-07-23 NOTE — P.PN
Subjective


Date of Service: 07/23/23


Chief Complaint: Left hip pain.


POD#3 left hip intramedullary marino fixation. He is doing well - no new changes. 

Awaiting placement for inpatient rehab.





Review of Systems


10-point ROS is otherwise unremarkable


Musculoskeletal: Leg Pain (left hip)





Physical Examination





- Vital Signs


Temperature: 97.9 F


Blood Pressure: 122/71


Pulse: 79


Respirations: 14


Pulse Ox (%): 98





Assessment And Plan





- Plan





- Physical Exam


General: Alert, In no apparent distress, Oriented x3


HEENT: Atraumatic, Sclerae nonicteric


Respiratory: Clear to auscultation bilaterally, Normal air movement


Cardiovascular: No edema, Regular rate/rhythm, No murmurs


Gastrointestinal: Soft, Non-distended, No tenderness


Musculoskeletal: No clubbing, Tenderness (left hip - improving)


Integumentary: No rashes


Neurological: Normal speech, Normal affect








# Traumatic Ground-Level Fall complicated by Left Intertrochanteric/Greater 

Trochanteric Femur Fracture


- Left hip x-ray = "intertrochanteric/greater trochanteric fracture left femur. 

Fracture fragments  by 1 centimeter. No dislocation."


- Orthopedic Surgery consulted and he was evaluated by Dr. Verdugo - 

recommendations appreciated


   - s/p left hip intramedullary marino fixation on 07/19/2023


- PRN pain medicine


- PT consulted - recommended inpatient rehab. Appreciate CM assistance





# Orthostatic Hypotension with history of Hypertension


-  mL x 1, followed by LR @ 100 mL/hr


- Hold home carvedilol





# Hypothyroidism


- Continue home levothyroxine





# Hyperlipidemia


- Continue home atorvastatin





# Obstructive Sleep Apnea


- May use home CPAP








Giuseppe Gama M.D.

## 2023-07-23 NOTE — P.PN
Date of Service: 07/24/23





Subjective:








ROS: 


10 point ROS as noted above, otherwise negative





Physical Exam:


Gen: Alert,oriented, NAD


HEENT: normal conjunctiva, sclera anicteric


CV: regular rate & rhythm, no edema


Pulm: non-labored respirations on room air, clear bilaterally


Abd: soft, non-tender, non-distended


MSK: left hip tenderness


Neuro: normal speech, normal affect, moves all extremities





Problem List:


1. Left Intertrochanteric/Greater Trochanteric Femur Fracture, now s/p left hip 

intramedullary marino fixation (7/19)


2. Traumatic Fall


3. Orthostatic Hypotension with history of Hypertension


4. Hypothyroidism


5. Hyperlipidemia


6. Obstructive Sleep Apnea








PLAN


Left hip x-ray (7/18): intertrochanteric/greater trochanteric fracture left 

femur.


Orthopedic Surgery consulted


s/p left hip intramedullary marino fixation (7/19)


PRN pain medicine


PT consulted 


ss/cm consulted for inpatient rehab


Hold home carvedilol


Continue home levothyroxine


Continue home atorvastatin


May use home CPAP


VTE: Lovenox

## 2023-07-24 LAB
HCT VFR BLD CALC: 36.4 % (ref 39.6–49)
LYMPHOCYTES # SPEC AUTO: 5.3 K/UL (ref 0.7–4.9)
MCV RBC: 98.5 FL (ref 80–100)
PMV BLD: 9.8 FL (ref 7.6–11.3)
RBC # BLD: 3.7 M/UL (ref 4.33–5.43)

## 2023-07-24 RX ADMIN — Medication SCH: at 21:00

## 2023-07-24 RX ADMIN — ENOXAPARIN SODIUM SCH MG: 30 INJECTION SUBCUTANEOUS at 05:33

## 2023-07-24 RX ADMIN — ACETAMINOPHEN PRN MG: 325 TABLET ORAL at 21:08

## 2023-07-24 RX ADMIN — LEVOTHYROXINE SODIUM SCH MG: 112 TABLET ORAL at 05:33

## 2023-07-24 RX ADMIN — Medication SCH ML: at 09:03

## 2023-07-24 RX ADMIN — PANTOPRAZOLE SODIUM SCH MG: 40 TABLET, DELAYED RELEASE ORAL at 21:06

## 2023-07-24 RX ADMIN — ENOXAPARIN SODIUM SCH MG: 30 INJECTION SUBCUTANEOUS at 18:07

## 2023-07-24 RX ADMIN — ATORVASTATIN CALCIUM SCH MG: 20 TABLET, FILM COATED ORAL at 21:06

## 2023-07-25 RX ADMIN — ENOXAPARIN SODIUM SCH MG: 30 INJECTION SUBCUTANEOUS at 05:42

## 2023-07-25 RX ADMIN — Medication SCH ML: at 08:29

## 2023-07-25 RX ADMIN — ACETAMINOPHEN PRN MG: 325 TABLET ORAL at 18:24

## 2023-07-25 RX ADMIN — Medication SCH ML: at 21:14

## 2023-07-25 RX ADMIN — LEVOTHYROXINE SODIUM SCH MG: 112 TABLET ORAL at 05:42

## 2023-07-25 RX ADMIN — ATORVASTATIN CALCIUM SCH MG: 20 TABLET, FILM COATED ORAL at 21:13

## 2023-07-25 RX ADMIN — ENOXAPARIN SODIUM SCH MG: 30 INJECTION SUBCUTANEOUS at 16:59

## 2023-07-25 RX ADMIN — PANTOPRAZOLE SODIUM SCH MG: 40 TABLET, DELAYED RELEASE ORAL at 21:14

## 2023-07-26 RX ADMIN — PANTOPRAZOLE SODIUM SCH MG: 40 TABLET, DELAYED RELEASE ORAL at 21:27

## 2023-07-26 RX ADMIN — Medication SCH ML: at 08:40

## 2023-07-26 RX ADMIN — ACETAMINOPHEN PRN MG: 325 TABLET ORAL at 09:25

## 2023-07-26 RX ADMIN — ACETAMINOPHEN PRN MG: 325 TABLET ORAL at 21:29

## 2023-07-26 RX ADMIN — ATORVASTATIN CALCIUM SCH MG: 20 TABLET, FILM COATED ORAL at 21:27

## 2023-07-26 RX ADMIN — Medication SCH ML: at 21:27

## 2023-07-26 RX ADMIN — ENOXAPARIN SODIUM SCH MG: 30 INJECTION SUBCUTANEOUS at 17:20

## 2023-07-26 RX ADMIN — LEVOTHYROXINE SODIUM SCH MG: 112 TABLET ORAL at 06:42

## 2023-07-26 RX ADMIN — ENOXAPARIN SODIUM SCH MG: 30 INJECTION SUBCUTANEOUS at 06:45

## 2023-07-27 RX ADMIN — ENOXAPARIN SODIUM SCH MG: 30 INJECTION SUBCUTANEOUS at 17:00

## 2023-07-27 RX ADMIN — LEVOTHYROXINE SODIUM SCH MG: 112 TABLET ORAL at 06:25

## 2023-07-27 RX ADMIN — ATORVASTATIN CALCIUM SCH MG: 20 TABLET, FILM COATED ORAL at 20:39

## 2023-07-27 RX ADMIN — HYDROCODONE BITARTRATE AND ACETAMINOPHEN PRN TAB: 10; 325 TABLET ORAL at 20:51

## 2023-07-27 RX ADMIN — ACETAMINOPHEN PRN MG: 325 TABLET ORAL at 17:01

## 2023-07-27 RX ADMIN — PANTOPRAZOLE SODIUM SCH MG: 40 TABLET, DELAYED RELEASE ORAL at 20:40

## 2023-07-27 RX ADMIN — Medication SCH: at 20:40

## 2023-07-27 RX ADMIN — ENOXAPARIN SODIUM SCH MG: 30 INJECTION SUBCUTANEOUS at 06:25

## 2023-07-27 RX ADMIN — Medication SCH: at 07:47

## 2023-07-28 VITALS — DIASTOLIC BLOOD PRESSURE: 71 MMHG | TEMPERATURE: 98.5 F | SYSTOLIC BLOOD PRESSURE: 111 MMHG

## 2023-07-28 RX ADMIN — ENOXAPARIN SODIUM SCH MG: 30 INJECTION SUBCUTANEOUS at 06:11

## 2023-07-28 RX ADMIN — Medication SCH: at 09:00

## 2023-07-28 RX ADMIN — LEVOTHYROXINE SODIUM SCH MG: 112 TABLET ORAL at 06:11
